# Patient Record
Sex: MALE | Race: WHITE | NOT HISPANIC OR LATINO | Employment: FULL TIME | URBAN - METROPOLITAN AREA
[De-identification: names, ages, dates, MRNs, and addresses within clinical notes are randomized per-mention and may not be internally consistent; named-entity substitution may affect disease eponyms.]

---

## 2018-05-23 ENCOUNTER — HOSPITAL ENCOUNTER (EMERGENCY)
Facility: HOSPITAL | Age: 23
Discharge: HOME | End: 2018-05-23
Attending: EMERGENCY MEDICINE

## 2018-05-23 VITALS
TEMPERATURE: 98 F | WEIGHT: 150 LBS | HEART RATE: 140 BPM | SYSTOLIC BLOOD PRESSURE: 119 MMHG | RESPIRATION RATE: 20 BRPM | OXYGEN SATURATION: 92 % | DIASTOLIC BLOOD PRESSURE: 75 MMHG | BODY MASS INDEX: 19.25 KG/M2 | HEIGHT: 74 IN

## 2018-05-23 DIAGNOSIS — T40.1X1A ACCIDENTAL OVERDOSE OF HEROIN, INITIAL ENCOUNTER: Primary | ICD-10-CM

## 2018-05-23 PROCEDURE — 99282 EMERGENCY DEPT VISIT SF MDM: CPT

## 2018-05-24 NOTE — ED PROVIDER NOTES
"Encounter Date: 5/23/2018       History     Chief Complaint   Patient presents with    Anxiety     used IV heroin prior to arrival to right wrist, was found unresponsive by friend who poured water on him and brought him to the ER     Chief complaint:  Overdose    HPI:  23-year-old male presents with not feeling well" after injecting heroin.  He denies any headache, shortness of breath nausea vomiting. He wishes to leave before further evaluation.          Review of patient's allergies indicates:  No Known Allergies  Past Medical History:   Diagnosis Date    Depression     Suicidal Ideations a/w excessive ETOH consumption     Past Surgical History:   Procedure Laterality Date    TONSILLECTOMY       Family History   Problem Relation Age of Onset    Alcohol abuse Father     Hypertension Father     Early death Paternal Uncle     Arthritis Maternal Grandmother     Cancer Maternal Grandmother     Arthritis Maternal Grandfather     Kidney disease Maternal Grandfather      Social History   Substance Use Topics    Smoking status: Current Every Day Smoker     Packs/day: 1.00     Types: Cigarettes    Smokeless tobacco: Not on file    Alcohol use Yes      Comment: Pint of whiskey or 6 pack of beer daily     Review of Systems   Constitutional: Positive for diaphoresis. Negative for activity change, appetite change, chills, fatigue and fever.   Eyes: Negative for visual disturbance.   Respiratory: Negative for apnea and shortness of breath.    Cardiovascular: Negative for chest pain and palpitations.   Gastrointestinal: Negative for abdominal distention and abdominal pain.   Genitourinary: Negative for difficulty urinating.   Musculoskeletal: Negative for neck pain.   Skin: Negative for pallor and rash.   Neurological: Negative for headaches.   Hematological: Does not bruise/bleed easily.   Psychiatric/Behavioral: Negative for agitation.       Physical Exam     Initial Vitals [05/23/18 1844]   BP Pulse Resp Temp " SpO2   119/75 (!) 140 20 97.7 °F (36.5 °C) (!) 92 %      MAP       89.67         Physical Exam    Nursing note and vitals reviewed.  Constitutional: Vital signs are normal. He is not diaphoretic.  Non-toxic appearance. He does not have a sickly appearance. No distress.   HENT:   Head: Normocephalic and atraumatic.   Pinpoint pupils   Eyes: Conjunctivae are normal.   Neck: Phonation normal. Neck supple. No thyromegaly present. No tracheal deviation present.   Cardiovascular: Normal rate.   Pulmonary/Chest: Breath sounds normal. No respiratory distress. He has no wheezes. He has no rhonchi. He has no rales.   Abdominal: Normal appearance. He exhibits no distension.   Neurological: He is alert and oriented to person, place, and time.   Skin: Skin is warm and intact. No pallor.   Diaphoretic   Psychiatric: He has a normal mood and affect. His speech is normal and behavior is normal. Thought content normal.         ED Course   Procedures  Labs Reviewed - No data to display          Medical Decision Making:   ED Management:  23-year-old male presents after heroin overdose.  He has spontaneous respirations and is oriented to person place time and situation.  Lung exam is normal with no evidence of aspiration.  He is encouraged to remain for observation but wishes to leave immediately.                      Clinical Impression:   The encounter diagnosis was Accidental overdose of heroin, initial encounter.                           Ye Garcia III, MD  05/23/18 4325

## 2018-05-24 NOTE — ED NOTES
Pt wants to leave without being seen. Pt assessed by MD in triage. Pt is awake and answering questions, drowsy, friend is with pt, and appears to be of full alertness. Pt wishes to leave despite MD advice to stay for observation. Friend instructed to monitor pt closely and come back for worsening of condition. Pt ambulated to waiting room, steady gait, friend at side.

## 2023-05-12 ENCOUNTER — HOSPITAL ENCOUNTER (EMERGENCY)
Facility: HOSPITAL | Age: 28
Discharge: HOME | End: 2023-05-12
Attending: EMERGENCY MEDICINE
Payer: OTHER MISCELLANEOUS

## 2023-05-12 VITALS
RESPIRATION RATE: 17 BRPM | DIASTOLIC BLOOD PRESSURE: 78 MMHG | HEART RATE: 84 BPM | TEMPERATURE: 98 F | SYSTOLIC BLOOD PRESSURE: 134 MMHG | HEIGHT: 73 IN | WEIGHT: 150 LBS | OXYGEN SATURATION: 98 % | BODY MASS INDEX: 19.88 KG/M2

## 2023-05-12 DIAGNOSIS — S49.90XA ARM INJURY: ICD-10-CM

## 2023-05-12 DIAGNOSIS — S62.309A CLOSED FRACTURE OF METACARPAL BONE, UNSPECIFIED FRACTURE MORPHOLOGY, UNSPECIFIED METACARPAL, UNSPECIFIED PORTION OF METACARPAL, INITIAL ENCOUNTER: Primary | ICD-10-CM

## 2023-05-12 PROCEDURE — 25000003 PHARM REV CODE 250: Performed by: EMERGENCY MEDICINE

## 2023-05-12 PROCEDURE — 99283 EMERGENCY DEPT VISIT LOW MDM: CPT

## 2023-05-12 PROCEDURE — 29125 APPL SHORT ARM SPLINT STATIC: CPT | Mod: RT

## 2023-05-12 RX ORDER — HYDROCODONE BITARTRATE AND ACETAMINOPHEN 5; 325 MG/1; MG/1
1 TABLET ORAL EVERY 4 HOURS PRN
Qty: 14 TABLET | Refills: 0 | Status: SHIPPED | OUTPATIENT
Start: 2023-05-12

## 2023-05-12 RX ORDER — HYDROCODONE BITARTRATE AND ACETAMINOPHEN 5; 325 MG/1; MG/1
1 TABLET ORAL
Status: COMPLETED | OUTPATIENT
Start: 2023-05-12 | End: 2023-05-12

## 2023-05-12 RX ADMIN — HYDROCODONE BITARTRATE AND ACETAMINOPHEN 1 TABLET: 5; 325 TABLET ORAL at 01:05

## 2023-05-12 NOTE — ED NOTES
C/O RT HAND PAIN. Pt states smashed between 2 boards. + swelling. No lac noted. + increase pain with movement. Cap refill <3 sec distal.

## 2023-05-12 NOTE — Clinical Note
"Romulo Felix (Joel) was seen and treated in our emergency department on 5/12/2023.  He may return to work on 05/22/2023.       If you have any questions or concerns, please don't hesitate to call.      Amy TOLBERT    "

## 2023-05-12 NOTE — ED PROVIDER NOTES
Encounter Date: 5/12/2023       History     Chief Complaint   Patient presents with    Hand Injury     Hand crushed between two pieces of wood 1 hour pta     28-year-old male presents emergency department with complaint of pain to the right hand.  Patient states that he was working when 2 4x4s fell on his hand.  Patient has tenderness and swelling to the right hand diffusely no open wounds noted.  Patient is right-hand dominant.  Patient has taken no medications for relief of symptoms    Review of patient's allergies indicates:  No Known Allergies  Past Medical History:   Diagnosis Date    Depression     Suicidal Ideations a/w excessive ETOH consumption     Past Surgical History:   Procedure Laterality Date    TONSILLECTOMY       Family History   Problem Relation Age of Onset    Alcohol abuse Father     Hypertension Father     Early death Paternal Uncle     Arthritis Maternal Grandmother     Cancer Maternal Grandmother     Arthritis Maternal Grandfather     Kidney disease Maternal Grandfather      Social History     Tobacco Use    Smoking status: Every Day     Packs/day: 1.00     Types: Cigarettes   Substance Use Topics    Alcohol use: Yes     Comment: Pint of whiskey or 6 pack of beer daily    Drug use: Yes     Comment: Heroin, prescription drugs (roxicodone, hydrocodone)     Review of Systems   Constitutional: Negative.    HENT: Negative.     Respiratory: Negative.     Gastrointestinal: Negative.    Genitourinary: Negative.    Musculoskeletal:         Right hand pain   Skin: Negative.    Hematological: Negative.    Psychiatric/Behavioral: Negative.     All other systems reviewed and are negative.    Physical Exam     Initial Vitals [05/12/23 1133]   BP Pulse Resp Temp SpO2   134/78 84 18 98.3 °F (36.8 °C) 98 %      MAP       --         Physical Exam    Nursing note and vitals reviewed.  Constitutional: He appears well-developed and well-nourished.   Eyes: Conjunctivae are normal. Pupils are equal, round, and  reactive to light.   Cardiovascular:  Normal rate, regular rhythm, normal heart sounds and intact distal pulses.           Musculoskeletal:      Right hand: Swelling and bony tenderness present. Decreased range of motion.        Hands:       Comments: Patient has months willing over the right 4th and 5th metacarpal area no open wounds     Neurological: He is alert and oriented to person, place, and time. He has normal strength. GCS score is 15. GCS eye subscore is 4. GCS verbal subscore is 5. GCS motor subscore is 6.   Skin: Skin is warm and dry. No rash noted.   Psychiatric: He has a normal mood and affect.       ED Course   Splint Application    Date/Time: 5/12/2023 6:26 PM  Performed by: ESTEFANI Kline  Authorized by: Anaya Burns MD   Location details: right hand  Splint type: ulnar gutter  Supplies used: Ortho-Glass  Post-procedure: The splinted body part was neurovascularly unchanged following the procedure.  Patient tolerance: Patient tolerated the procedure well with no immediate complications      Labs Reviewed - No data to display       Imaging Results              X-Ray Hand 3 view Right (Final result)  Result time 05/12/23 12:45:39      Final result by Dimas Valladares MD (05/12/23 12:45:39)                   Narrative:    XR WRIST 3 OR MORE VIEWS, XR HAND 3 OR MORE VIEWS    CLINICAL HISTORY:  28 years Male Right hand/wrist smashed in between 2 pieces of wood this morning. Swelling and redness to posterior hand and wrist    COMPARISON: None    FINDINGS: Acute comminuted fractures involving the fourth and fifth metacarpal bases, potentially with intra-articular extension into the carpometacarpal joints. Overlying soft tissue swelling. No soft tissue gas or radiopaque foreign body. Distal radius and ulna are intact. Carpus appears intact.    IMPRESSION:    Acute comminuted fractures involving the fourth and fifth metacarpal bases.    Electronically signed by:  Dimas Valladares MD  5/12/2023 12:45 PM  CDT Workstation: [id number]                                     X-Ray Wrist Complete Right (Final result)  Result time 05/12/23 12:45:39      Final result by Dimas Valladares MD (05/12/23 12:45:39)                   Narrative:    XR WRIST 3 OR MORE VIEWS, XR HAND 3 OR MORE VIEWS    CLINICAL HISTORY:  28 years Male Right hand/wrist smashed in between 2 pieces of wood this morning. Swelling and redness to posterior hand and wrist    COMPARISON: None    FINDINGS: Acute comminuted fractures involving the fourth and fifth metacarpal bases, potentially with intra-articular extension into the carpometacarpal joints. Overlying soft tissue swelling. No soft tissue gas or radiopaque foreign body. Distal radius and ulna are intact. Carpus appears intact.    IMPRESSION:    Acute comminuted fractures involving the fourth and fifth metacarpal bases.    Electronically signed by:  Dimas Valladares MD  5/12/2023 12:45 PM CDT Workstation: [id number]                                     Medications   HYDROcodone-acetaminophen 5-325 mg per tablet 1 tablet (1 tablet Oral Given 5/12/23 1330)     Medical Decision Making:   History:   Old Records Summarized: records from previous admission(s).  Initial Assessment:   28-year-old male presents emergency department with complaint of pain to the right hand.  Patient states that he was working when 2 4x4s fell on his hand.  Patient has tenderness and swelling to the right hand diffusely no open wounds noted.  Patient is right-hand dominant.  Patient has taken no medications for relief of symptoms    Differential Diagnosis:   Considerations include contusion, fracture, sprain  Clinical Tests:   Radiological Study: Ordered and Reviewed  ED Management:  28-year-old male presents emergency department reports that he had pieces of wood fell on top of his hand at work today.  Patient has comminuted fractures of the 4th and 5th metacarpal at the base of the metacarpal.  No open wounds suggestive of  fight bite.  Patient placed in a ulnar gutter splint he was given Norco with mild relief of symptoms.  Patient was given splint instructions instructed follow up with orthopedist for definitive care he will be discharged home with a short course of narcotic medications, given return precautions                        Clinical Impression:   Final diagnoses:  [S49.90XA] Arm injury  [S62.309A] Closed fracture of metacarpal bone, unspecified fracture morphology, unspecified metacarpal, unspecified portion of metacarpal, initial encounter (Primary)        ED Disposition Condition    Discharge Stable          ED Prescriptions       Medication Sig Dispense Start Date End Date Auth. Provider    HYDROcodone-acetaminophen (NORCO) 5-325 mg per tablet Take 1 tablet by mouth every 4 (four) hours as needed for Pain. 14 tablet 5/12/2023 -- ESTEFANI Kline          Follow-up Information       Follow up With Specialties Details Why Contact Info    Akshat Morales MD Orthopedic Surgery Schedule an appointment as soon as possible for a visit in 3 days  [address] [place] [address] [address] [place] [id number]  [phone]               ESTEFANI Kline  05/12/23 1828       ESTEFANI Kline  05/12/23 1829

## 2023-05-12 NOTE — DISCHARGE INSTRUCTIONS
Please leave splint on and did not get it wet  Motrin for mild pain and swelling  Norco as directed if needed for severe pain   Return for any concerns

## 2023-05-23 ENCOUNTER — HOSPITAL ENCOUNTER (OUTPATIENT)
Dept: RADIOLOGY | Facility: HOSPITAL | Age: 28
Discharge: HOME | End: 2023-05-23
Attending: ORTHOPAEDIC SURGERY
Payer: OTHER MISCELLANEOUS

## 2023-05-23 ENCOUNTER — OFFICE VISIT (OUTPATIENT)
Dept: ORTHOPEDICS | Facility: CLINIC | Age: 28
End: 2023-05-23
Payer: OTHER MISCELLANEOUS

## 2023-05-23 VITALS — BODY MASS INDEX: 19.88 KG/M2 | RESPIRATION RATE: 18 BRPM | HEIGHT: 73 IN | WEIGHT: 150 LBS

## 2023-05-23 DIAGNOSIS — S69.91XA INJURY OF RIGHT WRIST, INITIAL ENCOUNTER: ICD-10-CM

## 2023-05-23 DIAGNOSIS — S69.91XA INJURY OF RIGHT WRIST, INITIAL ENCOUNTER: Primary | ICD-10-CM

## 2023-05-23 DIAGNOSIS — S62.316A CLOSED DISPLACED FRACTURE OF BASE OF FIFTH METACARPAL BONE OF RIGHT HAND, INITIAL ENCOUNTER: ICD-10-CM

## 2023-05-23 PROCEDURE — 73200 CT HAND WITHOUT CONTRAST RIGHT: ICD-10-PCS | Mod: 26,RT,, | Performed by: RADIOLOGY

## 2023-05-23 PROCEDURE — 73200 CT UPPER EXTREMITY W/O DYE: CPT | Mod: 26,RT,, | Performed by: RADIOLOGY

## 2023-05-23 PROCEDURE — 99999 PR PBB SHADOW E&M-EST. PATIENT-LVL II: CPT | Mod: PBBFAC,,, | Performed by: ORTHOPAEDIC SURGERY

## 2023-05-23 PROCEDURE — 99999 PR PBB SHADOW E&M-EST. PATIENT-LVL II: ICD-10-PCS | Mod: PBBFAC,,, | Performed by: ORTHOPAEDIC SURGERY

## 2023-05-23 PROCEDURE — 73200 CT UPPER EXTREMITY W/O DYE: CPT | Mod: TC,RT

## 2023-05-23 PROCEDURE — 99204 OFFICE O/P NEW MOD 45 MIN: CPT | Mod: S$GLB,,, | Performed by: ORTHOPAEDIC SURGERY

## 2023-05-23 PROCEDURE — 99204 PR OFFICE/OUTPT VISIT, NEW, LEVL IV, 45-59 MIN: ICD-10-PCS | Mod: S$GLB,,, | Performed by: ORTHOPAEDIC SURGERY

## 2023-05-23 NOTE — PROGRESS NOTES
Patient ID: Romulo Felix is a 28 y.o. male    Chief Complaint:   Chief Complaint   Patient presents with    Right Hand - Injury       History of Present Illness:    This is a pleasant 28 y.o. male who presents for evaluation of right hand pain. He reports an injury 1 week ago while having his hand crushed with a 2 x 4 while at work. He  had immediate pain and difficulty bearing weight on that extremity. He presented to outside ER and was diagnosed with a 4th and 5th metacarpal base fracture. He was placed in a splint and referred to us for orthopedic evaluation. He is independent with activities of daily living at baseline. He walks without an assistive device.     Diabetic: No  Smoker:  Yes  Hx of DVT, PE: No    Hand dominance: Right    Occupation:  [employer]     PAST MEDICAL HISTORY:   Past Medical History:   Diagnosis Date    Depression     Suicidal Ideations a/w excessive ETOH consumption     PAST SURGICAL HISTORY:   Past Surgical History:   Procedure Laterality Date    TONSILLECTOMY       FAMILY HISTORY:   Family History   Problem Relation Age of Onset    Alcohol abuse Father     Hypertension Father     Early death Paternal Uncle     Arthritis Maternal Grandmother     Cancer Maternal Grandmother     Arthritis Maternal Grandfather     Kidney disease Maternal Grandfather      SOCIAL HISTORY:   Social History     Occupational History    Not on file   Tobacco Use    Smoking status: Every Day     Packs/day: 1.00     Types: Cigarettes    Smokeless tobacco: Not on file   Substance and Sexual Activity    Alcohol use: Yes     Comment: Pint of whiskey or 6 pack of beer daily    Drug use: Yes     Comment: Heroin, prescription drugs (roxicodone, hydrocodone)    Sexual activity: Yes     Partners: Female     Birth control/protection: Condom     Comment: Last used heroin one week ago.         MEDICATIONS:   Current Outpatient Medications:     HYDROcodone-acetaminophen (NORCO) 5-325 mg per tablet, Take 1 tablet by mouth  every 4 (four) hours as needed for Pain., Disp: 14 tablet, Rfl: 0  ALLERGIES: Review of patient's allergies indicates:  No Known Allergies      Physical Exam     Vitals:    05/23/23 0911   Resp: 18     Alert and oriented to person, place and time. No acute distress. Well-groomed, not ill appearing. Pupils round and reactive, normal respiratory effort, no audible wheezing.     There is mild-to-moderate swelling of the right hand and tenderness to palpation over the 4th 5th metacarpal base.  No obvious deformity.  No evidence of open injury.  No obvious rotational deformity.  FDS FDP tested in isolation without deficit.  Sensation intact distally.        Imaging:       X-Ray: I have reviewed all pertinent results/findings and my personal findings are:  Comminuted base of the 4th and 5th metacarpal fractures.  There is widening of the 5th joint line with likely impaction      Assessment & Plan    Injury of right wrist, initial encounter  -     CT Hand Without Contrast Right; Future; Expected date: 05/23/2023    Closed displaced fracture of base of fifth metacarpal bone of right hand, initial encounter         Treatment options were discussed with him in detail.  I went over his x-rays which show comminuted displaced fracture of the 4th metacarpal bases.  The 4th is overall well aligned.  The 5th metacarpal base looks quite comminuted and the joint surface looks not congruent.  My recommendation at this time is for urgent CT scan of the right hand to evaluate.  He will follow up tomorrow for results

## 2023-05-24 ENCOUNTER — ANESTHESIA EVENT (OUTPATIENT)
Dept: SURGERY | Facility: HOSPITAL | Age: 28
End: 2023-05-24
Payer: OTHER MISCELLANEOUS

## 2023-05-24 ENCOUNTER — LAB VISIT (OUTPATIENT)
Dept: LAB | Facility: HOSPITAL | Age: 28
End: 2023-05-24
Attending: ORTHOPAEDIC SURGERY
Payer: OTHER MISCELLANEOUS

## 2023-05-24 ENCOUNTER — OFFICE VISIT (OUTPATIENT)
Dept: ORTHOPEDICS | Facility: CLINIC | Age: 28
End: 2023-05-24
Payer: OTHER MISCELLANEOUS

## 2023-05-24 VITALS — WEIGHT: 150 LBS | HEIGHT: 73 IN | RESPIRATION RATE: 16 BRPM | BODY MASS INDEX: 19.88 KG/M2

## 2023-05-24 DIAGNOSIS — Z01.818 PRE-OP EXAM: ICD-10-CM

## 2023-05-24 DIAGNOSIS — S62.316A CLOSED DISPLACED FRACTURE OF BASE OF FIFTH METACARPAL BONE OF RIGHT HAND, INITIAL ENCOUNTER: Primary | ICD-10-CM

## 2023-05-24 DIAGNOSIS — S62.316A DISPLACED FRACTURE OF BASE OF FIFTH METACARPAL BONE OF RIGHT HAND: ICD-10-CM

## 2023-05-24 LAB
ALBUMIN SERPL BCP-MCNC: 4 G/DL (ref 3.5–5.2)
ALP SERPL-CCNC: 80 U/L (ref 55–135)
ALT SERPL W/O P-5'-P-CCNC: 55 U/L (ref 10–44)
ANION GAP SERPL CALC-SCNC: 6 MMOL/L (ref 8–16)
AST SERPL-CCNC: 43 U/L (ref 10–40)
BASOPHILS # BLD AUTO: 0.09 K/UL (ref 0–0.2)
BASOPHILS NFR BLD: 1.3 % (ref 0–1.9)
BILIRUB SERPL-MCNC: 0.3 MG/DL (ref 0.1–1)
BUN SERPL-MCNC: 7 MG/DL (ref 6–20)
CALCIUM SERPL-MCNC: 8.9 MG/DL (ref 8.7–10.5)
CHLORIDE SERPL-SCNC: 106 MMOL/L (ref 95–110)
CO2 SERPL-SCNC: 28 MMOL/L (ref 23–29)
CREAT SERPL-MCNC: 0.9 MG/DL (ref 0.5–1.4)
DIFFERENTIAL METHOD: ABNORMAL
EOSINOPHIL # BLD AUTO: 0.3 K/UL (ref 0–0.5)
EOSINOPHIL NFR BLD: 3.6 % (ref 0–8)
ERYTHROCYTE [DISTWIDTH] IN BLOOD BY AUTOMATED COUNT: 13.5 % (ref 11.5–14.5)
EST. GFR  (NO RACE VARIABLE): >60 ML/MIN/1.73 M^2
GLUCOSE SERPL-MCNC: 88 MG/DL (ref 70–110)
HCT VFR BLD AUTO: 35.7 % (ref 40–54)
HGB BLD-MCNC: 11.1 G/DL (ref 14–18)
IMM GRANULOCYTES # BLD AUTO: 0.01 K/UL (ref 0–0.04)
IMM GRANULOCYTES NFR BLD AUTO: 0.1 % (ref 0–0.5)
LYMPHOCYTES # BLD AUTO: 3 K/UL (ref 1–4.8)
LYMPHOCYTES NFR BLD: 42.1 % (ref 18–48)
MCH RBC QN AUTO: 24.7 PG (ref 27–31)
MCHC RBC AUTO-ENTMCNC: 31.1 G/DL (ref 32–36)
MCV RBC AUTO: 79 FL (ref 82–98)
MONOCYTES # BLD AUTO: 0.6 K/UL (ref 0.3–1)
MONOCYTES NFR BLD: 7.8 % (ref 4–15)
NEUTROPHILS # BLD AUTO: 3.3 K/UL (ref 1.8–7.7)
NEUTROPHILS NFR BLD: 45.1 % (ref 38–73)
NRBC BLD-RTO: 0 /100 WBC
PLATELET # BLD AUTO: 356 K/UL (ref 150–450)
PMV BLD AUTO: 10.1 FL (ref 9.2–12.9)
POTASSIUM SERPL-SCNC: 3.9 MMOL/L (ref 3.5–5.1)
PROT SERPL-MCNC: 7.4 G/DL (ref 6–8.4)
RBC # BLD AUTO: 4.5 M/UL (ref 4.6–6.2)
SODIUM SERPL-SCNC: 140 MMOL/L (ref 136–145)
WBC # BLD AUTO: 7.2 K/UL (ref 3.9–12.7)

## 2023-05-24 PROCEDURE — 80053 COMPREHEN METABOLIC PANEL: CPT | Performed by: ORTHOPAEDIC SURGERY

## 2023-05-24 PROCEDURE — 99999 PR PBB SHADOW E&M-EST. PATIENT-LVL II: CPT | Mod: PBBFAC,,, | Performed by: ORTHOPAEDIC SURGERY

## 2023-05-24 PROCEDURE — 36415 COLL VENOUS BLD VENIPUNCTURE: CPT | Performed by: ORTHOPAEDIC SURGERY

## 2023-05-24 PROCEDURE — 99214 PR OFFICE/OUTPT VISIT, EST, LEVL IV, 30-39 MIN: ICD-10-PCS | Mod: 57,S$GLB,, | Performed by: ORTHOPAEDIC SURGERY

## 2023-05-24 PROCEDURE — 99999 PR PBB SHADOW E&M-EST. PATIENT-LVL II: ICD-10-PCS | Mod: PBBFAC,,, | Performed by: ORTHOPAEDIC SURGERY

## 2023-05-24 PROCEDURE — 99214 OFFICE O/P EST MOD 30 MIN: CPT | Mod: 57,S$GLB,, | Performed by: ORTHOPAEDIC SURGERY

## 2023-05-24 PROCEDURE — 85025 COMPLETE CBC W/AUTO DIFF WBC: CPT | Performed by: ORTHOPAEDIC SURGERY

## 2023-05-24 RX ORDER — MUPIROCIN 20 MG/G
OINTMENT TOPICAL
Status: CANCELLED | OUTPATIENT
Start: 2023-05-24

## 2023-05-24 RX ORDER — FENTANYL CITRATE 50 UG/ML
25 INJECTION, SOLUTION INTRAMUSCULAR; INTRAVENOUS EVERY 5 MIN PRN
Status: CANCELLED | OUTPATIENT
Start: 2023-05-25

## 2023-05-24 RX ORDER — MIDAZOLAM HYDROCHLORIDE 1 MG/ML
1 INJECTION INTRAMUSCULAR; INTRAVENOUS
Status: CANCELLED | OUTPATIENT
Start: 2023-05-25

## 2023-05-24 NOTE — H&P (VIEW-ONLY)
Patient ID: Romulo Felix is a 28 y.o. male    Chief Complaint:   Chief Complaint   Patient presents with    Right Wrist - Injury, Pain       History of Present Illness:    This is a pleasant 28 y.o. male who presents for evaluation of right hand pain. He reports an injury 1 week ago while having his hand crushed with a 2 x 4 while at work. He  had immediate pain and difficulty bearing weight on that extremity. He presented to outside ER and was diagnosed with a 4th and 5th metacarpal base fracture. He was placed in a splint and referred to us for orthopedic evaluation. He is independent with activities of daily living at baseline. He walks without an assistive device.     Diabetic: No  Smoker:  Yes  Hx of DVT, PE: No    Hand dominance: Right    Occupation:  [employer]     ________________________________________________________________    Interval history 05/24/2023 : patient returns today for CT follow up of his right wrist and hand.  Also here to discuss surgery.    PAST MEDICAL HISTORY:   Past Medical History:   Diagnosis Date    Depression     Suicidal Ideations a/w excessive ETOH consumption     PAST SURGICAL HISTORY:   Past Surgical History:   Procedure Laterality Date    TONSILLECTOMY       FAMILY HISTORY:   Family History   Problem Relation Age of Onset    Alcohol abuse Father     Hypertension Father     Early death Paternal Uncle     Arthritis Maternal Grandmother     Cancer Maternal Grandmother     Arthritis Maternal Grandfather     Kidney disease Maternal Grandfather      SOCIAL HISTORY:   Social History     Occupational History    Not on file   Tobacco Use    Smoking status: Every Day     Packs/day: 1.00     Types: Cigarettes    Smokeless tobacco: Not on file   Substance and Sexual Activity    Alcohol use: Yes     Comment: Pint of whiskey or 6 pack of beer daily    Drug use: Yes     Comment: Heroin, prescription drugs (roxicodone, hydrocodone)    Sexual activity: Yes     Partners: Female     Birth  control/protection: Condom     Comment: Last used heroin one week ago.         MEDICATIONS:   Current Outpatient Medications:     HYDROcodone-acetaminophen (NORCO) 5-325 mg per tablet, Take 1 tablet by mouth every 4 (four) hours as needed for Pain., Disp: 14 tablet, Rfl: 0  ALLERGIES: Review of patient's allergies indicates:  No Known Allergies      Physical Exam     Vitals:    05/24/23 1050   Resp: 16     Alert and oriented to person, place and time. No acute distress. Well-groomed, not ill appearing. Pupils round and reactive, normal respiratory effort, no audible wheezing.     There is mild-to-moderate swelling of the right hand and tenderness to palpation over the 4th 5th metacarpal base.  No obvious deformity.  No evidence of open injury.  No obvious rotational deformity.  FDS FDP tested in isolation without deficit.  Sensation intact distally.        Imaging:       X-Ray: I have reviewed all pertinent results/findings and my personal findings are:  Comminuted base of the 4th and 5th metacarpal fractures.  There is widening of the 5th joint line with likely impaction    CT of the right hand and wrist confirms fracture of the 4th and 5th metacarpal bases.  There is comminution at the base with impaction of the 5th.  There is mild dorsal subluxation of the 4th.      Assessment & Plan    Closed displaced fracture of base of fifth metacarpal bone of right hand, initial encounter    Pre-op exam  -     COMPREHENSIVE METABOLIC PANEL; Future; Expected date: 05/24/2023  -     CBC W/ AUTO DIFFERENTIAL; Future; Expected date: 05/24/2023       Discussed with him CT results and x-rays results which show fracture subluxation 4th and 5th metacarpal with comminution.  We discussed treatment options including closed reduction percutaneous pinning versus possible open reduction and pinning.  We will plan on doing this tomorrow in the operating room.  We discussed the protocol including pin removal at 4-6 weeks and early physical  therapy.  We discussed risk of pin site infection and damage to surrounding neurovascular structures.  Despite the risks she elects to proceed.    _________________________________________________________________      Diagnosis and findings were discussed with him in lay terms. I discussed the findings and treatment options with them at length. Questions were actively sought and all questions were answered to their apparent satisfaction. We discussed the risks and benefits of surgery. We reviewed the operative indications surgical technique and potential rehabilitation involved. Risks including, but not limited to pain, bleeding, infection, damage to surrounding neurovascular structures, and other soft tissues, decreased range of motion, instability, poor cosmetic result, scarring/painful scars, poor functional result, reflex sympathetic dystrophy. We discussed as well the risk of anesthesia, DVT/PE and possible need for additional surgical intervention in lay terms. Despite the risks as explained to them, they wished to proceed with surgery. He expressed understanding and agreement with the treatment plan and understand that no guarantee of outcome is implied or expressed given.       Romulo Felix will be scheduled for the following procedure(s):     Closed versus open reduction percutaneous pinning right hand, 4th and 5th metacarpal base        Post-Op Medications to be prescribed:   Percocet 5/325mg Take 1-2 tablets every 4-6 hours PRN pain #28  Zofran 4mg oral disintegrating tablets every 8 hours PRN nausea/vomiting   Motrin 600 mg TID PRN     DME/Bracing:  None  Post-op Physical Therapy to begin: 6+ weeks    Medical Clearance: No  Hx of DVT,PE, anesthetic complications: No    Additional notes/concerns:  Workman's comp    Opioid Disclosure  Today we discussed the reasons why the prescription is necessary as well as: the risks of addiction and overdose associated with opioid drugs and the dangers of taking  opioid drugs with alcohol, benzodiazepines and other central nervous system depressants; alternative treatments that may be available; the risks associated with the use of the drugs being prescribed, specifically that opioids are highly addictive, even when taken as prescribed, that there is a risk of developing a physical or psychological dependence on the controlled dangerous substance, and that the risks of taking more opioids than prescribed or mixing sedatives, benzodiazepines or alcohol with opioids can result in fatal respiratory depression.

## 2023-05-24 NOTE — PROGRESS NOTES
Patient ID: Romulo Felix is a 28 y.o. male    Chief Complaint:   Chief Complaint   Patient presents with    Right Wrist - Injury, Pain       History of Present Illness:    This is a pleasant 28 y.o. male who presents for evaluation of right hand pain. He reports an injury 1 week ago while having his hand crushed with a 2 x 4 while at work. He  had immediate pain and difficulty bearing weight on that extremity. He presented to outside ER and was diagnosed with a 4th and 5th metacarpal base fracture. He was placed in a splint and referred to us for orthopedic evaluation. He is independent with activities of daily living at baseline. He walks without an assistive device.     Diabetic: No  Smoker:  Yes  Hx of DVT, PE: No    Hand dominance: Right    Occupation:  [employer]     ________________________________________________________________    Interval history 05/24/2023 : patient returns today for CT follow up of his right wrist and hand.  Also here to discuss surgery.    PAST MEDICAL HISTORY:   Past Medical History:   Diagnosis Date    Depression     Suicidal Ideations a/w excessive ETOH consumption     PAST SURGICAL HISTORY:   Past Surgical History:   Procedure Laterality Date    TONSILLECTOMY       FAMILY HISTORY:   Family History   Problem Relation Age of Onset    Alcohol abuse Father     Hypertension Father     Early death Paternal Uncle     Arthritis Maternal Grandmother     Cancer Maternal Grandmother     Arthritis Maternal Grandfather     Kidney disease Maternal Grandfather      SOCIAL HISTORY:   Social History     Occupational History    Not on file   Tobacco Use    Smoking status: Every Day     Packs/day: 1.00     Types: Cigarettes    Smokeless tobacco: Not on file   Substance and Sexual Activity    Alcohol use: Yes     Comment: Pint of whiskey or 6 pack of beer daily    Drug use: Yes     Comment: Heroin, prescription drugs (roxicodone, hydrocodone)    Sexual activity: Yes     Partners: Female     Birth  control/protection: Condom     Comment: Last used heroin one week ago.         MEDICATIONS:   Current Outpatient Medications:     HYDROcodone-acetaminophen (NORCO) 5-325 mg per tablet, Take 1 tablet by mouth every 4 (four) hours as needed for Pain., Disp: 14 tablet, Rfl: 0  ALLERGIES: Review of patient's allergies indicates:  No Known Allergies      Physical Exam     Vitals:    05/24/23 1050   Resp: 16     Alert and oriented to person, place and time. No acute distress. Well-groomed, not ill appearing. Pupils round and reactive, normal respiratory effort, no audible wheezing.     There is mild-to-moderate swelling of the right hand and tenderness to palpation over the 4th 5th metacarpal base.  No obvious deformity.  No evidence of open injury.  No obvious rotational deformity.  FDS FDP tested in isolation without deficit.  Sensation intact distally.        Imaging:       X-Ray: I have reviewed all pertinent results/findings and my personal findings are:  Comminuted base of the 4th and 5th metacarpal fractures.  There is widening of the 5th joint line with likely impaction    CT of the right hand and wrist confirms fracture of the 4th and 5th metacarpal bases.  There is comminution at the base with impaction of the 5th.  There is mild dorsal subluxation of the 4th.      Assessment & Plan    Closed displaced fracture of base of fifth metacarpal bone of right hand, initial encounter    Pre-op exam  -     COMPREHENSIVE METABOLIC PANEL; Future; Expected date: 05/24/2023  -     CBC W/ AUTO DIFFERENTIAL; Future; Expected date: 05/24/2023       Discussed with him CT results and x-rays results which show fracture subluxation 4th and 5th metacarpal with comminution.  We discussed treatment options including closed reduction percutaneous pinning versus possible open reduction and pinning.  We will plan on doing this tomorrow in the operating room.  We discussed the protocol including pin removal at 4-6 weeks and early physical  therapy.  We discussed risk of pin site infection and damage to surrounding neurovascular structures.  Despite the risks she elects to proceed.    _________________________________________________________________      Diagnosis and findings were discussed with him in lay terms. I discussed the findings and treatment options with them at length. Questions were actively sought and all questions were answered to their apparent satisfaction. We discussed the risks and benefits of surgery. We reviewed the operative indications surgical technique and potential rehabilitation involved. Risks including, but not limited to pain, bleeding, infection, damage to surrounding neurovascular structures, and other soft tissues, decreased range of motion, instability, poor cosmetic result, scarring/painful scars, poor functional result, reflex sympathetic dystrophy. We discussed as well the risk of anesthesia, DVT/PE and possible need for additional surgical intervention in lay terms. Despite the risks as explained to them, they wished to proceed with surgery. He expressed understanding and agreement with the treatment plan and understand that no guarantee of outcome is implied or expressed given.       Romulo Felix will be scheduled for the following procedure(s):     Closed versus open reduction percutaneous pinning right hand, 4th and 5th metacarpal base        Post-Op Medications to be prescribed:   Percocet 5/325mg Take 1-2 tablets every 4-6 hours PRN pain #28  Zofran 4mg oral disintegrating tablets every 8 hours PRN nausea/vomiting   Motrin 600 mg TID PRN     DME/Bracing:  None  Post-op Physical Therapy to begin: 6+ weeks    Medical Clearance: No  Hx of DVT,PE, anesthetic complications: No    Additional notes/concerns:  Workman's comp    Opioid Disclosure  Today we discussed the reasons why the prescription is necessary as well as: the risks of addiction and overdose associated with opioid drugs and the dangers of taking  opioid drugs with alcohol, benzodiazepines and other central nervous system depressants; alternative treatments that may be available; the risks associated with the use of the drugs being prescribed, specifically that opioids are highly addictive, even when taken as prescribed, that there is a risk of developing a physical or psychological dependence on the controlled dangerous substance, and that the risks of taking more opioids than prescribed or mixing sedatives, benzodiazepines or alcohol with opioids can result in fatal respiratory depression.

## 2023-05-25 ENCOUNTER — HOSPITAL ENCOUNTER (OUTPATIENT)
Facility: HOSPITAL | Age: 28
Discharge: HOME | End: 2023-05-25
Attending: ORTHOPAEDIC SURGERY | Admitting: ORTHOPAEDIC SURGERY
Payer: OTHER MISCELLANEOUS

## 2023-05-25 ENCOUNTER — ANESTHESIA (OUTPATIENT)
Dept: SURGERY | Facility: HOSPITAL | Age: 28
End: 2023-05-25
Payer: OTHER MISCELLANEOUS

## 2023-05-25 DIAGNOSIS — S62.316A CLOSED DISPLACED FRACTURE OF BASE OF FIFTH METACARPAL BONE OF RIGHT HAND, INITIAL ENCOUNTER: ICD-10-CM

## 2023-05-25 DIAGNOSIS — S62.316A DISPLACED FRACTURE OF BASE OF FIFTH METACARPAL BONE OF RIGHT HAND: ICD-10-CM

## 2023-05-25 PROCEDURE — 26608 TREAT METACARPAL FRACTURE: CPT | Mod: 51,RT,, | Performed by: ORTHOPAEDIC SURGERY

## 2023-05-25 PROCEDURE — 63600175 PHARM REV CODE 636 W HCPCS: Performed by: ANESTHESIOLOGY

## 2023-05-25 PROCEDURE — 26608 PR CLOSED RX METACARPAL FX,PERCUT: ICD-10-PCS | Mod: RT,,, | Performed by: ORTHOPAEDIC SURGERY

## 2023-05-25 PROCEDURE — 27200651 HC AIRWAY, LMA: Performed by: ANESTHESIOLOGY

## 2023-05-25 PROCEDURE — D9220A PRA ANESTHESIA: ICD-10-PCS | Mod: CRNA,,, | Performed by: NURSE ANESTHETIST, CERTIFIED REGISTERED

## 2023-05-25 PROCEDURE — 36000708 HC OR TIME LEV III 1ST 15 MIN: Performed by: ORTHOPAEDIC SURGERY

## 2023-05-25 PROCEDURE — D9220A PRA ANESTHESIA: Mod: CRNA,,, | Performed by: NURSE ANESTHETIST, CERTIFIED REGISTERED

## 2023-05-25 PROCEDURE — D9220A PRA ANESTHESIA: Mod: ANES,,, | Performed by: ANESTHESIOLOGY

## 2023-05-25 PROCEDURE — 37000009 HC ANESTHESIA EA ADD 15 MINS: Performed by: ORTHOPAEDIC SURGERY

## 2023-05-25 PROCEDURE — 63600175 PHARM REV CODE 636 W HCPCS: Performed by: NURSE ANESTHETIST, CERTIFIED REGISTERED

## 2023-05-25 PROCEDURE — 94799 UNLISTED PULMONARY SVC/PX: CPT

## 2023-05-25 PROCEDURE — 64415 NJX AA&/STRD BRCH PLXS IMG: CPT | Performed by: ANESTHESIOLOGY

## 2023-05-25 PROCEDURE — 25000003 PHARM REV CODE 250: Performed by: ANESTHESIOLOGY

## 2023-05-25 PROCEDURE — 63600175 PHARM REV CODE 636 W HCPCS: Performed by: ORTHOPAEDIC SURGERY

## 2023-05-25 PROCEDURE — C9290 INJ, BUPIVACAINE LIPOSOME: HCPCS | Performed by: ANESTHESIOLOGY

## 2023-05-25 PROCEDURE — 36000709 HC OR TIME LEV III EA ADD 15 MIN: Performed by: ORTHOPAEDIC SURGERY

## 2023-05-25 PROCEDURE — 71000015 HC POSTOP RECOV 1ST HR: Performed by: ORTHOPAEDIC SURGERY

## 2023-05-25 PROCEDURE — 99900104 DSU ONLY-NO CHARGE-EA ADD'L HR (STAT): Performed by: ORTHOPAEDIC SURGERY

## 2023-05-25 PROCEDURE — 25000003 PHARM REV CODE 250: Performed by: NURSE ANESTHETIST, CERTIFIED REGISTERED

## 2023-05-25 PROCEDURE — 99900103 DSU ONLY-NO CHARGE-INITIAL HR (STAT): Performed by: ORTHOPAEDIC SURGERY

## 2023-05-25 PROCEDURE — 71000033 HC RECOVERY, INTIAL HOUR: Performed by: ORTHOPAEDIC SURGERY

## 2023-05-25 PROCEDURE — 37000008 HC ANESTHESIA 1ST 15 MINUTES: Performed by: ORTHOPAEDIC SURGERY

## 2023-05-25 PROCEDURE — D9220A PRA ANESTHESIA: ICD-10-PCS | Mod: ANES,,, | Performed by: ANESTHESIOLOGY

## 2023-05-25 PROCEDURE — 71000039 HC RECOVERY, EACH ADD'L HOUR: Performed by: ORTHOPAEDIC SURGERY

## 2023-05-25 PROCEDURE — 27200750 HC INSULATED NEEDLE/ STIMUPLEX: Performed by: ANESTHESIOLOGY

## 2023-05-25 RX ORDER — BUPIVACAINE HYDROCHLORIDE 5 MG/ML
INJECTION, SOLUTION EPIDURAL; INTRACAUDAL
Status: DISCONTINUED | OUTPATIENT
Start: 2023-05-25 | End: 2023-05-25

## 2023-05-25 RX ORDER — DEXAMETHASONE SODIUM PHOSPHATE 4 MG/ML
INJECTION, SOLUTION INTRA-ARTICULAR; INTRALESIONAL; INTRAMUSCULAR; INTRAVENOUS; SOFT TISSUE
Status: DISCONTINUED | OUTPATIENT
Start: 2023-05-25 | End: 2023-05-25

## 2023-05-25 RX ORDER — OXYCODONE HYDROCHLORIDE 10 MG/1
10 TABLET ORAL EVERY 4 HOURS PRN
Status: CANCELLED | OUTPATIENT
Start: 2023-05-25

## 2023-05-25 RX ORDER — LORAZEPAM 2 MG/ML
0.25 INJECTION INTRAMUSCULAR ONCE AS NEEDED
Status: ACTIVE | OUTPATIENT
Start: 2023-05-25 | End: 2034-10-21

## 2023-05-25 RX ORDER — ONDANSETRON 4 MG/1
8 TABLET, ORALLY DISINTEGRATING ORAL EVERY 8 HOURS PRN
Status: CANCELLED | OUTPATIENT
Start: 2023-05-25

## 2023-05-25 RX ORDER — ONDANSETRON HYDROCHLORIDE 2 MG/ML
INJECTION, SOLUTION INTRAMUSCULAR; INTRAVENOUS
Status: DISCONTINUED | OUTPATIENT
Start: 2023-05-25 | End: 2023-05-25

## 2023-05-25 RX ORDER — SODIUM CHLORIDE 0.9 % (FLUSH) 0.9 %
10 SYRINGE (ML) INJECTION
Status: DISCONTINUED | OUTPATIENT
Start: 2023-05-25 | End: 2023-05-25 | Stop reason: HOSPADM

## 2023-05-25 RX ORDER — MEPERIDINE HYDROCHLORIDE 50 MG/ML
12.5 INJECTION INTRAMUSCULAR; INTRAVENOUS; SUBCUTANEOUS ONCE AS NEEDED
Status: ACTIVE | OUTPATIENT
Start: 2023-05-25 | End: 2023-05-26

## 2023-05-25 RX ORDER — OXYCODONE HYDROCHLORIDE 5 MG/1
5 TABLET ORAL ONCE AS NEEDED
Status: ACTIVE | OUTPATIENT
Start: 2023-05-25 | End: 2034-10-21

## 2023-05-25 RX ORDER — ONDANSETRON 2 MG/ML
4 INJECTION INTRAMUSCULAR; INTRAVENOUS ONCE AS NEEDED
Status: ACTIVE | OUTPATIENT
Start: 2023-05-25 | End: 2034-10-21

## 2023-05-25 RX ORDER — DIPHENHYDRAMINE HYDROCHLORIDE 50 MG/ML
12.5 INJECTION INTRAMUSCULAR; INTRAVENOUS ONCE AS NEEDED
Status: ACTIVE | OUTPATIENT
Start: 2023-05-25 | End: 2034-10-21

## 2023-05-25 RX ORDER — PROPOFOL 10 MG/ML
VIAL (ML) INTRAVENOUS
Status: DISCONTINUED | OUTPATIENT
Start: 2023-05-25 | End: 2023-05-25

## 2023-05-25 RX ORDER — FENTANYL CITRATE 50 UG/ML
25 INJECTION, SOLUTION INTRAMUSCULAR; INTRAVENOUS EVERY 5 MIN PRN
Status: ACTIVE | OUTPATIENT
Start: 2023-05-25

## 2023-05-25 RX ORDER — IBUPROFEN 600 MG/1
600 TABLET ORAL EVERY 6 HOURS PRN
Status: CANCELLED | OUTPATIENT
Start: 2023-05-25

## 2023-05-25 RX ORDER — OXYCODONE AND ACETAMINOPHEN 5; 325 MG/1; MG/1
1 TABLET ORAL EVERY 6 HOURS PRN
Qty: 28 TABLET | Refills: 0 | Status: SHIPPED | OUTPATIENT
Start: 2023-05-25

## 2023-05-25 RX ORDER — MUPIROCIN 20 MG/G
OINTMENT TOPICAL
Status: DISCONTINUED | OUTPATIENT
Start: 2023-05-25 | End: 2023-05-25 | Stop reason: HOSPADM

## 2023-05-25 RX ORDER — CEFAZOLIN SODIUM 2 G/50ML
2 SOLUTION INTRAVENOUS
Status: COMPLETED | OUTPATIENT
Start: 2023-05-25 | End: 2023-05-25

## 2023-05-25 RX ORDER — LIDOCAINE HYDROCHLORIDE 20 MG/ML
INJECTION INTRAVENOUS
Status: DISCONTINUED | OUTPATIENT
Start: 2023-05-25 | End: 2023-05-25

## 2023-05-25 RX ORDER — IBUPROFEN 600 MG/1
600 TABLET ORAL 3 TIMES DAILY
Qty: 30 TABLET | Refills: 0 | Status: SHIPPED | OUTPATIENT
Start: 2023-05-25 | End: 2023-06-04

## 2023-05-25 RX ORDER — PROCHLORPERAZINE EDISYLATE 5 MG/ML
5 INJECTION INTRAMUSCULAR; INTRAVENOUS EVERY 30 MIN PRN
Status: ACTIVE | OUTPATIENT
Start: 2023-05-25

## 2023-05-25 RX ORDER — FENTANYL CITRATE 50 UG/ML
INJECTION, SOLUTION INTRAMUSCULAR; INTRAVENOUS
Status: DISCONTINUED | OUTPATIENT
Start: 2023-05-25 | End: 2023-05-25

## 2023-05-25 RX ORDER — LIDOCAINE HYDROCHLORIDE 10 MG/ML
1 INJECTION, SOLUTION EPIDURAL; INFILTRATION; INTRACAUDAL; PERINEURAL ONCE
Status: ACTIVE | OUTPATIENT
Start: 2023-05-25

## 2023-05-25 RX ORDER — ONDANSETRON 4 MG/1
4 TABLET, ORALLY DISINTEGRATING ORAL EVERY 8 HOURS PRN
Qty: 30 TABLET | Refills: 0 | Status: SHIPPED | OUTPATIENT
Start: 2023-05-25

## 2023-05-25 RX ORDER — HYDROCODONE BITARTRATE AND ACETAMINOPHEN 5; 325 MG/1; MG/1
1 TABLET ORAL EVERY 4 HOURS PRN
Status: CANCELLED | OUTPATIENT
Start: 2023-05-25

## 2023-05-25 RX ORDER — HYDROMORPHONE HYDROCHLORIDE 2 MG/ML
0.2 INJECTION, SOLUTION INTRAMUSCULAR; INTRAVENOUS; SUBCUTANEOUS EVERY 5 MIN PRN
Status: ACTIVE | OUTPATIENT
Start: 2023-05-25

## 2023-05-25 RX ADMIN — LIDOCAINE HYDROCHLORIDE 100 MG: 20 INJECTION, SOLUTION INTRAVENOUS at 12:05

## 2023-05-25 RX ADMIN — BUPIVACAINE HYDROCHLORIDE 12.5 ML: 5 INJECTION, SOLUTION EPIDURAL; INTRACAUDAL; PERINEURAL at 11:05

## 2023-05-25 RX ADMIN — DEXAMETHASONE SODIUM PHOSPHATE 4 MG: 4 INJECTION, SOLUTION INTRA-ARTICULAR; INTRALESIONAL; INTRAMUSCULAR; INTRAVENOUS; SOFT TISSUE at 12:05

## 2023-05-25 RX ADMIN — FENTANYL CITRATE 50 MCG: 50 INJECTION, SOLUTION INTRAMUSCULAR; INTRAVENOUS at 12:05

## 2023-05-25 RX ADMIN — BUPIVACAINE 12.5 ML: 13.3 INJECTION, SUSPENSION, LIPOSOMAL INFILTRATION at 11:05

## 2023-05-25 RX ADMIN — SODIUM CHLORIDE, SODIUM GLUCONATE, SODIUM ACETATE, POTASSIUM CHLORIDE AND MAGNESIUM CHLORIDE: 526; 502; 368; 37; 30 INJECTION, SOLUTION INTRAVENOUS at 10:05

## 2023-05-25 RX ADMIN — PROPOFOL 200 MG: 10 INJECTION, EMULSION INTRAVENOUS at 12:05

## 2023-05-25 RX ADMIN — ONDANSETRON 4 MG: 2 INJECTION INTRAMUSCULAR; INTRAVENOUS at 12:05

## 2023-05-25 RX ADMIN — CEFAZOLIN SODIUM 2 G: 2 SOLUTION INTRAVENOUS at 12:05

## 2023-05-25 NOTE — TRANSFER OF CARE
"Anesthesia Transfer of Care Note    Patient: Romulo Felix    Procedure(s) Performed: Procedure(s) (LRB):  ORIF, HAND (Right)    Patient location: PACU    Anesthesia Type: general    Transport from OR: Transported from OR on room air with adequate spontaneous ventilation    Post pain: adequate analgesia    Post assessment: no apparent anesthetic complications    Post vital signs: stable    Level of consciousness: awake    Nausea/Vomiting: no nausea/vomiting    Complications: none    Transfer of care protocol was followed      Last vitals:   Visit Vitals  BP (!) 119/58   Pulse 72   Temp 37.5 °C (99.5 °F) (Skin)   Resp 12   Ht 6' 1" (1.854 m)   Wt 68 kg (150 lb)   SpO2 98%   BMI 19.79 kg/m²     "

## 2023-05-25 NOTE — PLAN OF CARE
Pre-op complete. Family called to bedside. Text notifications initiated. Pt denies need for safe. Belongings in post op.

## 2023-05-25 NOTE — PLAN OF CARE
Discharge instructions given to pt and family/friend, verbalized understanding and questions answered. Belongings given back to pt. IV removed- catheter intact. Discharge via wheelchair.

## 2023-05-25 NOTE — PLAN OF CARE
VSS. NAD. Resp E/U. Calm and cooperative. Speech appropriate. Tolerating clear liquids. Denies nausea. Pain controlled. IV patent. No swelling or redness. Dressing to rt hand CDI. Ice applied and sling in place. Family notified of patient status. All safety measures taken. Bed in low position. Bedrails up x2. Bed locked.

## 2023-05-25 NOTE — ANESTHESIA POSTPROCEDURE EVALUATION
Anesthesia Post Evaluation    Patient: Romulo Felix    Procedure(s) Performed: Procedure(s) (LRB):  ORIF, HAND (Right)    Final Anesthesia Type: general      Patient location during evaluation: PACU  Patient participation: Yes- Able to Participate  Level of consciousness: awake and alert  Post-procedure vital signs: reviewed and stable  Pain management: adequate  Airway patency: patent    PONV status at discharge: No PONV  Anesthetic complications: no      Cardiovascular status: hemodynamically stable  Respiratory status: unassisted and room air  Hydration status: euvolemic  Follow-up not needed.          Vitals Value Taken Time   /62 05/25/23 1515   Temp 36.7 °C (98.1 °F) 05/25/23 1415   Pulse 65 05/25/23 1515   Resp 18 05/25/23 1515   SpO2 97 % 05/25/23 1515         Event Time   Out of Recovery 14:44:00         Pain/Joao Score: Joao Score: 10 (5/25/2023  2:30 PM)  Modified Joao Score: 20 (5/25/2023  3:15 PM)

## 2023-05-25 NOTE — ANESTHESIA PREPROCEDURE EVALUATION
05/25/2023  Romulo Felix is a 28 y.o., male.      Pre-op Assessment    I have reviewed the Patient Summary Reports.     I have reviewed the Nursing Notes. I have reviewed the NPO Status.   I have reviewed the Medications.     Review of Systems  Anesthesia Hx:  No problems with previous Anesthesia    Social:  Former Smoker    Hematology/Oncology:     Oncology Normal     EENT/Dental:EENT/Dental Normal   Pulmonary:  Pulmonary Normal    Musculoskeletal:   Right hand fracture    Psych:   Psychiatric History depression          Physical Exam  General: Well nourished, Cooperative, Alert and Oriented    Airway:  Mallampati: II   Mouth Opening: Normal  TM Distance: Normal  Tongue: Normal  Neck ROM: Normal ROM    Dental:  Intact        Anesthesia Plan  Type of Anesthesia, risks & benefits discussed:    Anesthesia Type: Gen Supraglottic Airway  Intra-op Monitoring Plan: Standard ASA Monitors  Post Op Pain Control Plan: multimodal analgesia and peripheral nerve block  Induction:  IV  Airway Plan: , Post-Induction  Informed Consent: Informed consent signed with the Patient and all parties understand the risks and agree with anesthesia plan.  All questions answered.   ASA Score: 2    Ready For Surgery From Anesthesia Perspective.     .

## 2023-05-25 NOTE — ANESTHESIA PROCEDURE NOTES
Peripheral Block    Patient location during procedure: pre-op   Block not for primary anesthetic.  Reason for block: at surgeon's request and post-op pain management   Post-op Pain Location: right hand   Start time: 5/25/2023 11:40 AM  Timeout: 5/25/2023 11:40 AM   End time: 5/25/2023 11:45 AM    Staffing  Authorizing Provider: Ihsan Mandujano MD  Performing Provider: Ihsan Mandujano MD    Preanesthetic Checklist  Completed: patient identified, IV checked, site marked, risks and benefits discussed, surgical consent, monitors and equipment checked, pre-op evaluation and timeout performed  Peripheral Block  Patient position: supine  Prep: ChloraPrep  Patient monitoring: heart rate, cardiac monitor, continuous pulse ox, continuous capnometry and frequent blood pressure checks  Block type: supraclavicular  Laterality: right  Injection technique: single shot  Needle  Needle type: Stimuplex   Needle gauge: 22 G  Needle length: 2 in  Needle localization: anatomical landmarks and ultrasound guidance   -ultrasound image captured on disc.  Assessment  Injection assessment: negative aspiration, negative parasthesia and local visualized surrounding nerve  Paresthesia pain: none  Heart rate change: no  Slow fractionated injection: yes  Pain Tolerance: comfortable throughout block and no complaints  Medications:    Medications: bupivacaine (pf) (MARCAINE) injection 0.5% - Perineural   12.5 mL - 5/25/2023 11:45:00 AM    Additional Notes  VSS.  [facility] RN monitoring vitals throughout procedure.  Patient tolerated procedure well.

## 2023-05-25 NOTE — OP NOTE
05/25/2023    PREOPERATIVE DIAGNOSIS:      Closed displaced intra-articular fracture base right 5th metacarpal  Closed displaced fracture base right 4th metacarpal    POSTOPERATIVE DIAGNOSIS: Same    PROCEDURE:      Closed reduction percutaneous pinning right 5th metacarpal  Closed reduction percutaneous pinning right 4th metacarpal      SURGEON: Akshat Morales MD    ASSISTANT: Doyle Nguyen _ PAC    He was present and scrubbed for the entirety of the procedure. They were required for patient positioning, retraction, insertion of implants and closure. Without him I would have been unable to safely perform the case due to only one scrub tech available.     ANESTHESIA:  general     ESTIMATED BLOOD LOSS:  5 mL    INDICATIONS:  Romulo Felix is a 28 y.o. year-old male who presented to my clinic with a chief complaint of right hand pain after a work injury. Imaging revealed a displaced intra-articular fractures of the 4th and 5th metacarpal bases..  We discussed treatment options including closed reduction versus open reduction versus nonoperative treatment.  Based on the amount of displacement and patient's age and functional status he wanted to proceed with surgical intervention.      We discussed the risks and benefits of the surgery in detail including stiffness, CRPS, damage to surrounding neurovascular structures, failure of fixation as well as need for revision surgery. Despite these risks they elected to proceed.       COMPONENTS USED:      * No implants in log *      DESCRIPTION OF PROCEDURE:  The patient was seen in the pre-operative area where consents were verified and the surgical site marked. They did receive a preoperative block for intra-operative and postoperative analgesia. The patient was taken to the Operating Room where anesthesia was administered by the Anesthesia Department. He was then placed in the supine  position and all superficial neurovascular structures were well padded.  The right  upper extremity  was then sterilely prepped and draped in the normal fashion.  Preoperative antibiotics were administered.  A time-out was performed verifying the procedure and laterality, all agreed and elected to proceed as planned.    We brought C-arm into the field and verified fracture displacement of the 4th and 5th metacarpal base.  The 5th metacarpal base was intra-articular with impaction.  We used a K-wire and placed it into the fracture site and used it as a joystick to get a better reduction.  We had a satisfactory reduction at this point and I placed a 0.054 K-wire through the 5th 4th and 3rd metacarpal base.  I placed a parallel pin slightly distal to this.  I also placed a 3rd pin through the tip of the base of the 5th metacarpal into the 4th metacarpal shaft for additional fixation as well as a slightly more distal transmetacarpal pin due to extension of the 4th base fracture into the shaft.  I took final x-rays.  I was satisfied with my length alignment and overall rotation.  We bent and cut the K-wires.  Covered these with Xeroform and  was placed in a ulnar gutter splint.    Disposition:      He will be nonweightbearing.  We will plan for pin removal at 4-6 weeks pending x-rays.  I will see him back in 2 weeks to convert to a cast.    Akshat Morales MD

## 2023-05-25 NOTE — DISCHARGE INSTRUCTIONS
Post op instructions for prevention of DVT  What is deep vein thrombosis?  Deep vein thrombosis (DVT) is the medical term for blood clots in the deep veins of the leg.  These blood clots can be dangerous.  A DVT can block a blood vessel and keep blood from getting where it needs to go.  Another problem is that the clot can travel to other parts of the body such as the lungs.  A clot that travels to the lungs is called a pulmonary embolus (PE) and can cause serious problems with breathing which can lead to death.  Am I at risk for DVT/PE?  If you are not very active, you are at risk of DVT.  Anyone confined to bed, sitting for long periods of time, recovering from surgery, etc. increases the risk of DVT.  Other risk factors are cancer diagnosis, certain medications, estrogen replacement in any form,older age, obesity, pregnancy, smoking, history of clotting disorders, and dehydration.  How will I know if I have a DVT?  Swelling in the lower leg  Pain  Warmth, redness, hardness or bulging of the vein  If you have any of these symptoms, call your doctors office right away.  Some people will not have any symptoms until the clot moves to the lungs.  What are the symptoms of a PE?  Panting, shortness of breath, or trouble breathing  Sharp, knife-like chest pain when you breathe  Coughing or coughing up blood  Rapid heartbeat  If you have any of these symptoms or get worse quickly, call 911 for emergency treatment.  How can I prevent a DVT?  Avoid long periods of inactivity and dont cross your legs--get up and walk around every hour or so.  Stay active--walking after surgery is highly encouraged.  This means you should get out of the house and walk in the neighborhood.  Going up and down stairs will not impair healing (unless advised against such activity by your doctor).    Drink plenty of noncaffeinated, nonalcoholic fluids each day to prevent dehydration.  Wear special support stockings, if they have been advised by  "your doctor.  If you travel, stop at least once an hour and walk around.  Avoid smoking (assistance with stopping is available through your healthcare provider)  Always notify your doctor if you are not able to follow the post operative instructions that are given to you at the time of discharge.  It may be necessary to prescribe one of the medications available to prevent DVT. We hope your stay was comfortable as you heal now, mend and rest.    For we have enjoyed taking care of you by giving your our best.    And as you get better, by regaining your health and strength;   We count it as a privilege to have served you and hope your time at [facility] was well spent.      Thank  You!!! Discharge Instructions: After Your Surgery/Procedure  Youve just had surgery. During surgery you were given medicine called anesthesia to keep you relaxed and free of pain. After surgery you may have some pain or nausea. This is common. Here are some tips for feeling better and getting well after surgery.     Stay on schedule with your medication.   Going home  Your doctor or nurse will show you how to take care of yourself when you go home. He or she will also answer your questions. Have an adult family member or friend drive you home.      For your safety we recommend these precaution for the first 24 hours after your procedure:  Do not drive or use heavy equipment.  Do not make important decisions or sign legal papers.  Do not drink alcohol.  Have someone stay with you, if needed. He or she can watch for problems and help keep you safe.  Your concentration, balance, coordination, and judgement may be impaired for many hours after anesthesia.  Use caution when ambulating or standing up.     You may feel weak and "washed out" after anesthesia and surgery.      Subtle residual effects of general anesthesia or sedation with regional / local anesthesia can last more than 24 hours.  Rest for the remainder of the day or longer if your " Doctor/Surgeon has advised you to do so.  Although you may feel normal within the first 24 hours, your reflexes and mental ability may be impaired without you realizing it.  You may feel dizzy, lightheaded or sleepy for 24 hours or longer.      Be sure to go to all follow-up visits with your doctor. And rest after your surgery for as long as your doctor tells you to.  Coping with pain  If you have pain after surgery, pain medicine will help you feel better. Take it as told, before pain becomes severe. Also, ask your doctor or pharmacist about other ways to control pain. This might be with heat, ice, or relaxation. And follow any other instructions your surgeon or nurse gives you.  Tips for taking pain medicine  To get the best relief possible, remember these points:  Pain medicines can upset your stomach. Taking them with a little food may help.  Most pain relievers taken by mouth need at least 20 to 30 minutes to start to work.  Taking medicine on a schedule can help you remember to take it. Try to time your medicine so that you can take it before starting an activity. This might be before you get dressed, go for a walk, or sit down for dinner.  Constipation is a common side effect of pain medicines. Call your doctor before taking any medicines such as laxatives or stool softeners to help ease constipation. Also ask if you should skip any foods. Drinking lots of fluids and eating foods such as fruits and vegetables that are high in fiber can also help. Remember, do not take laxatives unless your surgeon has prescribed them.  Drinking alcohol and taking pain medicine can cause dizziness and slow your breathing. It can even be deadly. Do not drink alcohol while taking pain medicine.  Pain medicine can make you react more slowly to things. Do not drive or run machinery while taking pain medicine.  Your health care provider may tell you to take acetaminophen to help ease your pain. Ask him or her how much you are  supposed to take each day. Acetaminophen or other pain relievers may interact with your prescription medicines or other over-the-counter (OTC) drugs. Some prescription medicines have acetaminophen and other ingredients. Using both prescription and OTC acetaminophen for pain can cause you to overdose. Read the labels on your OTC medicines with care. This will help you to clearly know the list of ingredients, how much to take, and any warnings. It may also help you not take too much acetaminophen. If you have questions or do not understand the information, ask your pharmacist or health care provider to explain it to you before you take the OTC medicine.  Managing nausea  Some people have an upset stomach after surgery. This is often because of anesthesia, pain, or pain medicine, or the stress of surgery. These tips will help you handle nausea and eat healthy foods as you get better. If you were on a special food plan before surgery, ask your doctor if you should follow it while you get better. These tips may help:  Do not push yourself to eat. Your body will tell you when to eat and how much.  Start off with clear liquids and soup. They are easier to digest.  Next try semi-solid foods, such as mashed potatoes, applesauce, and gelatin, as you feel ready.  Slowly move to solid foods. Dont eat fatty, rich, or spicy foods at first.  Do not force yourself to have 3 large meals a day. Instead eat smaller amounts more often.  Take pain medicines with a small amount of solid food, such as crackers or toast, to avoid nausea.     Call your surgeon if  You still have pain an hour after taking medicine. The medicine may not be strong enough.  You feel too sleepy, dizzy, or groggy. The medicine may be too strong.  You have side effects like nausea, vomiting, or skin changes, such as rash, itching, or hives.       If you have obstructive sleep apnea  You were given anesthesia medicine during surgery to keep you comfortable and  free of pain. After surgery, you may have more apnea spells because of this medicine and other medicines you were given. The spells may last longer than usual.   At home:  Keep using the continuous positive airway pressure (CPAP) device when you sleep. Unless your health care provider tells you not to, use it when you sleep, day or night. CPAP is a common device used to treat obstructive sleep apnea.  Talk with your provider before taking any pain medicine, muscle relaxants, or sedatives. Your provider will tell you about the possible dangers of taking these medicines.  © 6821-1755 [employer]. [address] [place] [id number]. All rights reserved. This information is not intended as a substitute for professional medical care. Always follow your healthcare professional's instructions. Using an Incentive Spirometer    An incentive spirometer is a device that helps you do deep breathing exercises. These exercises expand your lungs, aid in circulation, and help prevent pneumonia. Deep breathing exercises also help you breathe better and improve the function of your lungs by:  Keeping your lungs clear  Strengthening your breathing muscles  Helping prevent respiratory complications or problems  The incentive spirometer gives you a way to take an active part in recover. A nurse or therapist will teach you breathing exercises. To do these exercises, you will breathe in through your mouth and not your nose. The incentive spirometer only works correctly if you breathe in through your mouth.  Steps to clear lungs  Step 1. Exhale normally. Then, inhale normally.  Relax and breathe out.  Step 2. Place your lips tightly around the mouthpiece.  Make sure the device is upright and not tilted.  Step 3. Inhale as much air as you can through the mouthpiece (don't breath through your nose).  Inhale slowly and deeply.  Hold your breath long enough to keep the balls or disk raised for at least 3 to 5 seconds, or  as instructed by your healthcare provider.  Some spirometers have an indicator to let you know that you are breathing in too fast. If the indicator goes off, breathe in more slowly.  Step 4. Repeat the exercise regularly.  Do this exercise every hour while you're awake, or as instructed by your healthcare provider.  If you were taught deep breathing and coughing exercises, do them regularly as instructed by your healthcare provider.

## 2023-05-25 NOTE — ANESTHESIA PROCEDURE NOTES
Intubation    Date/Time: 5/25/2023 12:23 PM  Performed by: Thierry Sorensen CRNA  Authorized by: Ihsan Mandujano MD     Intubation:     Induction:  Intravenous    Intubated:  Postinduction    Mask Ventilation:  Not attempted    Attempts:  1    Attempted By:  CRNA    Difficult Airway Encountered?: No      Complications:  None    Airway Device:  Supraglottic airway/LMA    Airway Device Size:  4.0    Style/Cuff Inflation:  Cuffed    Secured at:  The lips    Placement Verified By:  Capnometry    Complicating Factors:  None    Findings Post-Intubation:  BS equal bilateral

## 2023-05-25 NOTE — BRIEF OP NOTE
[facility]  Brief Operative Note    Surgery Date: 5/25/2023     Surgeon(s) and Role:     * Akshat Morales MD - Primary    Assisting Surgeon: None    Pre-op Diagnosis:  Closed displaced fracture of base of fifth metacarpal bone of right hand, initial encounter [S62.316A]    Post-op Diagnosis:  Post-Op Diagnosis Codes:     * Closed displaced fracture of base of fifth metacarpal bone of right hand, initial encounter [S62.316A]    Procedure(s) (LRB):  ORIF, HAND (Right)    Anesthesia: General/Regional    Operative Findings: see op note     Estimated Blood Loss: * No values recorded between 5/25/2023 12:38 PM and 5/25/2023  1:08 PM *         Specimens:   Specimen (24h ago, onward)      None              Discharge Note    OUTCOME: Patient tolerated treatment/procedure well without complication and is now ready for discharge.    DISPOSITION: Home or Self Care    FINAL DIAGNOSIS:  <principal problem not specified>    FOLLOWUP: In clinic    DISCHARGE INSTRUCTIONS:  No discharge procedures on file.

## 2023-05-26 VITALS
RESPIRATION RATE: 18 BRPM | HEART RATE: 65 BPM | BODY MASS INDEX: 19.88 KG/M2 | DIASTOLIC BLOOD PRESSURE: 62 MMHG | TEMPERATURE: 98 F | OXYGEN SATURATION: 97 % | WEIGHT: 150 LBS | HEIGHT: 73 IN | SYSTOLIC BLOOD PRESSURE: 111 MMHG

## 2023-06-05 DIAGNOSIS — S62.316A CLOSED DISPLACED FRACTURE OF BASE OF FIFTH METACARPAL BONE OF RIGHT HAND, INITIAL ENCOUNTER: Primary | ICD-10-CM

## 2023-06-06 ENCOUNTER — OFFICE VISIT (OUTPATIENT)
Dept: ORTHOPEDICS | Facility: CLINIC | Age: 28
End: 2023-06-06
Payer: OTHER MISCELLANEOUS

## 2023-06-06 ENCOUNTER — HOSPITAL ENCOUNTER (OUTPATIENT)
Dept: RADIOLOGY | Facility: HOSPITAL | Age: 28
Discharge: HOME | End: 2023-06-06
Attending: ORTHOPAEDIC SURGERY
Payer: OTHER MISCELLANEOUS

## 2023-06-06 VITALS — BODY MASS INDEX: 19.88 KG/M2 | WEIGHT: 150 LBS | HEIGHT: 73 IN | RESPIRATION RATE: 17 BRPM

## 2023-06-06 DIAGNOSIS — S62.316A CLOSED DISPLACED FRACTURE OF BASE OF FIFTH METACARPAL BONE OF RIGHT HAND, INITIAL ENCOUNTER: Primary | ICD-10-CM

## 2023-06-06 DIAGNOSIS — S62.316A CLOSED DISPLACED FRACTURE OF BASE OF FIFTH METACARPAL BONE OF RIGHT HAND, INITIAL ENCOUNTER: ICD-10-CM

## 2023-06-06 DIAGNOSIS — S62.316D CLOSED DISPLACED FRACTURE OF BASE OF FIFTH METACARPAL BONE OF RIGHT HAND WITH ROUTINE HEALING, SUBSEQUENT ENCOUNTER: ICD-10-CM

## 2023-06-06 PROCEDURE — 99999 PR PBB SHADOW E&M-EST. PATIENT-LVL II: ICD-10-PCS | Mod: PBBFAC,,, | Performed by: ORTHOPAEDIC SURGERY

## 2023-06-06 PROCEDURE — 99024 POSTOP FOLLOW-UP VISIT: CPT | Mod: S$GLB,,, | Performed by: ORTHOPAEDIC SURGERY

## 2023-06-06 PROCEDURE — 73130 X-RAY EXAM OF HAND: CPT | Mod: TC,PO,RT

## 2023-06-06 PROCEDURE — 73130 XR HAND COMPLETE 3 VIEW RIGHT: ICD-10-PCS | Mod: 26,RT,, | Performed by: RADIOLOGY

## 2023-06-06 PROCEDURE — 73130 X-RAY EXAM OF HAND: CPT | Mod: 26,RT,, | Performed by: RADIOLOGY

## 2023-06-06 PROCEDURE — 99999 PR PBB SHADOW E&M-EST. PATIENT-LVL II: CPT | Mod: PBBFAC,,, | Performed by: ORTHOPAEDIC SURGERY

## 2023-06-06 PROCEDURE — 99024 PR POST-OP FOLLOW-UP VISIT: ICD-10-PCS | Mod: S$GLB,,, | Performed by: ORTHOPAEDIC SURGERY

## 2023-06-06 NOTE — PROGRESS NOTES
Post-op Note    HPI    Romulo Felix is here 2 weeks s/p the following procedure:     Closed reduction percutaneous pinning right hand including the 4th and 5th metacarpals    Overall doing well. Pain controlled on current regimen.  No drainage from the pin sites.  Compliant with nonweightbearing        Physical Exam:     Patient is alert and oriented no acute distress.       Right hand pin sites clean dry intact There is no evidence of infection.  There is no induration erythema or signs of infection.  Appropriate soft tissue swelling.  Compartments are soft and compressible.  Warm well-perfused extremity.    Imaging:     I have personally reviewed the following imaging and these are an interpretation of my findings:     X-Ray: I have reviewed all pertinent results/findings and my personal findings are:  Pin fixation of the 4th and 5th metacarpal base fracture.  Well-aligned.  No evidence of hardware failure    Assessment    Romulo Felix is 2 weeks Post-op     Plan:    Overall doing as expected.  We discussed expectations of surgery and postoperative course.     Pain: Continued postoperative pain regimen -- Tylenol/ibuprofen  Placed in a short arm ulnar gutter cast today.  Follow up 3 weeks with x-rays out of cast likely pin removal at that time

## 2023-06-26 DIAGNOSIS — S62.316D CLOSED DISPLACED FRACTURE OF BASE OF FIFTH METACARPAL BONE OF RIGHT HAND WITH ROUTINE HEALING, SUBSEQUENT ENCOUNTER: Primary | ICD-10-CM

## 2023-06-27 ENCOUNTER — HOSPITAL ENCOUNTER (OUTPATIENT)
Dept: RADIOLOGY | Facility: HOSPITAL | Age: 28
Discharge: HOME | End: 2023-06-27
Attending: ORTHOPAEDIC SURGERY
Payer: OTHER MISCELLANEOUS

## 2023-06-27 ENCOUNTER — OFFICE VISIT (OUTPATIENT)
Dept: ORTHOPEDICS | Facility: CLINIC | Age: 28
End: 2023-06-27
Payer: OTHER MISCELLANEOUS

## 2023-06-27 VITALS — BODY MASS INDEX: 19.88 KG/M2 | WEIGHT: 150 LBS | RESPIRATION RATE: 16 BRPM | HEIGHT: 73 IN

## 2023-06-27 DIAGNOSIS — S62.316D CLOSED DISPLACED FRACTURE OF BASE OF FIFTH METACARPAL BONE OF RIGHT HAND WITH ROUTINE HEALING, SUBSEQUENT ENCOUNTER: ICD-10-CM

## 2023-06-27 DIAGNOSIS — S62.316A CLOSED DISPLACED FRACTURE OF BASE OF FIFTH METACARPAL BONE OF RIGHT HAND, INITIAL ENCOUNTER: Primary | ICD-10-CM

## 2023-06-27 PROCEDURE — 99024 POSTOP FOLLOW-UP VISIT: CPT | Mod: S$GLB,,, | Performed by: ORTHOPAEDIC SURGERY

## 2023-06-27 PROCEDURE — 99024 PR POST-OP FOLLOW-UP VISIT: ICD-10-PCS | Mod: S$GLB,,, | Performed by: ORTHOPAEDIC SURGERY

## 2023-06-27 PROCEDURE — 73130 X-RAY EXAM OF HAND: CPT | Mod: 26,RT,, | Performed by: RADIOLOGY

## 2023-06-27 PROCEDURE — 73130 XR HAND COMPLETE 3 VIEW RIGHT: ICD-10-PCS | Mod: 26,RT,, | Performed by: RADIOLOGY

## 2023-06-27 PROCEDURE — 73130 X-RAY EXAM OF HAND: CPT | Mod: TC,PO,RT

## 2023-06-27 PROCEDURE — 99999 PR PBB SHADOW E&M-EST. PATIENT-LVL III: ICD-10-PCS | Mod: PBBFAC,,, | Performed by: ORTHOPAEDIC SURGERY

## 2023-06-27 PROCEDURE — 99999 PR PBB SHADOW E&M-EST. PATIENT-LVL III: CPT | Mod: PBBFAC,,, | Performed by: ORTHOPAEDIC SURGERY

## 2023-06-27 NOTE — PROGRESS NOTES
Post-op Note    HPI    Romulo Felix is here 5 weeks s/p the following procedure:     Closed reduction percutaneous pinning right hand including the 4th and 5th metacarpals    Overall doing well. Pain controlled on current regimen.  No drainage from the pin sites.  Compliant with nonweightbearing        Physical Exam:     Patient is alert and oriented no acute distress.       Right hand pin sites clean dry intact There is no evidence of infection.  There is no induration erythema or signs of infection.  Appropriate soft tissue swelling.  Compartments are soft and compressible.  Warm well-perfused extremity.    Imaging:     I have personally reviewed the following imaging and these are an interpretation of my findings:     X-Ray: I have reviewed all pertinent results/findings and my personal findings are:  Pin fixation of the 4th and 5th metacarpal base fracture.  Well-aligned.  No evidence of hardware failure .  Evidence of early healing.    Assessment    Romulo Felix is 6 weeks Post-op     Plan:    Overall doing as expected.  We discussed expectations of surgery and postoperative course.     Pain: Continued postoperative pain regimen -- Tylenol/ibuprofen  Pins removed at bedside, well tolerated  Begin hand therapy to work on range of motion and strength.  5 lb weight limit    Follow up 3 weeks with x-rays

## 2023-07-06 ENCOUNTER — CLINICAL SUPPORT (OUTPATIENT)
Dept: REHABILITATION | Facility: HOSPITAL | Age: 28
End: 2023-07-06
Attending: ORTHOPAEDIC SURGERY
Payer: OTHER MISCELLANEOUS

## 2023-07-06 DIAGNOSIS — S62.316A CLOSED DISPLACED FRACTURE OF BASE OF FIFTH METACARPAL BONE OF RIGHT HAND, INITIAL ENCOUNTER: Primary | ICD-10-CM

## 2023-07-06 PROCEDURE — 97110 THERAPEUTIC EXERCISES: CPT | Mod: PN

## 2023-07-06 PROCEDURE — 97165 OT EVAL LOW COMPLEX 30 MIN: CPT | Mod: PN

## 2023-07-06 NOTE — PLAN OF CARE
[facility]  Initial Evaluation   Date: 7/6/2023  Name: Romulo Felix  Clinic Number: [id number]  Therapy Diagnosis: right hand pain, stiffness, and weakness  Physician: Akshat Morales MD  Physician Orders: Eval and Treat  Begin hand therapy to work on range of motion and strength.  5 lb weight limit   Medical Diagnosis: S62.316A (ICD-10-CM) - Closed displaced fracture of base of fifth metacarpal bone of right hand, initial encounter   Surgical Procedure and Date: 5/25/2023  , ORIF, HAND Right   Evaluation Date: 7/6/2023  Insurance Authorization Period Expiration: 5/24/24  Plan of Care Certification Period: 8/30/23  Date of Return to MD: 7/25/23  Visit # / Visits authorized: Percy/1  Time In:10:00  Time Out: 10:45  Total Billable Time: 8 minutes    Precautions:  Standard   post op follow protocol    Subjective   Patient states: Been working my hand. Able to make a fist and straighten my fingers. Hand is still swollen. Hurts to  tightly and when I bring my wrist back which I can't do yet like my other one.   Doctor is going to do another Xray in a few weeks to see if it's healed enough for me to go back to work.   Involved Side: right  Dominant Side: Right  Date of Onset: 5/12/23 injury; 5/25/23 surgery  History of Current Condition/Mechanism of Injury: per emergency room note on 5/12/2023 working when 2  4 x 4 s fell on his hand. Patient noted that the 4 x 4 compressed into his hand demonstrating extending his wrist  Imaging: X-Ray:  Pin fixation of the 4th and 5th metacarpal base fracture.  Well-aligned.  No evidence of hardware failure .  Evidence of early healing.  6/27/2023   Previous Therapy: none    Past Medical History/Physical Systems Review:    has a past medical history of Depression.   has a past surgical history that includes Tonsillectomy and Open reduction and internal fixation (ORIF) of injury of hand (Right, 5/25/2023).  has a current medication list  which includes the following prescription(s): hydrocodone-acetaminophen, ondansetron, and oxycodone-acetaminophen, and the following Facility-Administered Medications: diphenhydramine, fentanyl, hydromorphone (pf), lidocaine (pf) 10 mg/ml (1%), lorazepam, ondansetron, oxycodone, and prochlorperazine.    Review of patient's allergies indicates:  No Known Allergies     Patient's Goals for Therapy: able to use hand normally  Pain:  Functional Pain Scale Rating 0-10: at eval 0/10  0/10 on average  0/10 at best  2/10 at worst  Location: right metacarpals  Description: tightness and little pain  Aggravating Factors: tight fist and extend wrist   Easing Factors: stop activity  Occupation:  [employer]  Working presently: out of work at this time  Current Job Specific Functional Deficits carry wood, use power hand tools, lifting walls, climbing   Previous functional status includes: Independent with all ADLs, working full time, full duty    lives with his family            FOTO outcome measure for hand  score: eval 57%   Predicted Goal 72 %-documents in Epic-see Media section    Objective   Observation: mild swelling unable to see extensor tendons and MP joints primarily at ulnar aspect of the hand, decrease in skin folds at fingers also; well healed pin sites   Sensation: denies any deficits  Palpation: mild tenderness at ulnar aspect of hand   Wrist -  Range Of Motion      7/6/2023 7/6/2023  Motion AROM Left A/PROM Right   Flexion 60 40/45   Extension 65 45/45   Ulnar Deviation NT 20   Radial Deviation NT 20   Wrist Extension and UD painful at little metacarpal.  Able to make a tight fist except Ring MP flexion 75,  left  95.  Full finger extension except little lacking 5 degrees active Proximal interphalangeal joint extension. Unable to hyperextend at ring (unable to lift off table) and achieving ~ 5 degrees at little finger.    Hand -  Strength  Martín dynamometer 2nd Rung in lbs;   Pinches: pinch gauge average of 2 trials in psi    7/06/23 7/06/23   Position Right Left   Hand : Trial 1 20 110   Hand : Trial 2 17 93   Hand : Trial 3 17 98    Average 18 100   Pinch Lateral 6.5 19   3 jaw bill 8 18.5   Tip 5 10      Treatment   Treatment Time In: 10:37  Treatment Time Out: 10:45  Total Treatment time separate from Evaluation time: 8 minutes   Patient received therapeutic exercises for 8 minutes including:   -AROM of wrist all planes, gentle pressure into radial aspect of the hand volar to gently stretch into extension and dorsum of hand for flexion   -Tendon glides  -fingers extension off table top each finger then all simultaneously  -fingers abduction and adduction   -RED theraputty gripping and pinching    Home Exercise Program/Education:  Issued HEP (see patient instructions in EMR) and educated on modality use for pain management . Exercises were reviewed and he was able to demonstrate them prior to the end of the session. Pt received a written copy of exercises to perform at home. He demonstrated good  understanding of the education provided.  Pt was advised to perform these exercises free of pain, go to point of stretch only. Suggested that he try to  his screw gun (only tool he has at home) using left to help support as needed. Hold in various positions needed to perform his job. Cautioned him not to weightbear into hand.  Patient Education: role of OT, goals for OT, scheduling/cancellations - pt verbalized understanding. Discussed insurance limitations with patient.    Assessment     Patient is a 28 y.o. right handed male presenting to skilled OT with diagnosis of status post right 4th and 5th metacarpal ORIF with pinning on 5/25/23 sustained on 5/12/23 when 2  4 x 4 s fell on his hand. Patient with pain of 0/10 to 2/10 described as tight.  He is mildly tender to palpation and has mild swelling at ulnar aspect of the hand. His  ROM is minimally limited; pain noted  with UD extension, and ulnar fingers ROM. He reports independence with self care with compensation for some and unable to work at this time due to healing process. His FOTO score: 57%.  A brief history was obtained from the patient and MD and surgery notes.  During the evaluation, the patient required no  modification or assistance.  There ar no anticipated barriers/co-morbid conditions/personal factors may affect the plan of care.   Patient will benefit from OT to address problems hindering functional use of UE. The following goals were discussed with the patient and patient is in agreement with them as to be addressed in the treatment plan. The patient's rehab potential is Good. Patient's educational, spiritual, cultural needs were considered.       Goals:   Short Term Goals  Independent in home program of ROM and strengthening in 2 visits  Patient with increase in range of motion and decrease in pain allowing increase use of right for self care in 2 weeks  Patient with increase in range of motion and strength and decrease in pain allowing him to be able to hold screw gun without pain in 3 weeks  Long Term   Patient with increase in range of motion and strength and decrease in pain allowing for greater ease with all household tasks in 5 weeks  Patient with increase in range of motion and strength and decrease in pain allowing for return to work in 6 weeks  Improve FOTO score by 10 points indicating improved function of right hand in 6 weeks  Plan   Outpatient Occupational Therapy 2 times weekly for 4 weeks then may decrease to 1 time a week for 2 weeks (will reassess periodically and adjust as needed) to include the following interventions: . Home Exercise and Stretching, Patient Education, Therapeutic Exercise (65827) - Improve muscle strength, ROM, flexibility and muscle function, Manual Stretching (86092) - Passive or Active stretching to improve muscle length and function, Soft Tissue Mobs (71733) - Increase  ROM tissue length, joint mechanics and modulate pain, Cryotherapy (51427) - Application of cold to decrease local swelling and decrease pain , Heat (55439) -  Application of heat to increase local circulation and decrease pain,  Whirlpool/fluidotherapy (21453) - Increase local tissue circulation, improve elasticity of tissues, increased blood flow for improved muscle strength, ROM, flexibility and function, Therapeutic activities (81605) use of dynamic activities to improve functional performance, Kinesio taping. Orthotic fabrication as needed      PAVEL Mcnair

## 2023-07-06 NOTE — PATIENT INSTRUCTIONS
Patient cautioned not to weightbear into hand.     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 Using left to assist holding as needed.

## 2023-07-17 ENCOUNTER — CLINICAL SUPPORT (OUTPATIENT)
Dept: REHABILITATION | Facility: HOSPITAL | Age: 28
End: 2023-07-17
Payer: OTHER MISCELLANEOUS

## 2023-07-17 DIAGNOSIS — S62.316A CLOSED DISPLACED FRACTURE OF BASE OF FIFTH METACARPAL BONE OF RIGHT HAND, INITIAL ENCOUNTER: Primary | ICD-10-CM

## 2023-07-17 PROCEDURE — 97110 THERAPEUTIC EXERCISES: CPT | Mod: PN

## 2023-07-17 PROCEDURE — 97022 WHIRLPOOL THERAPY: CPT | Mod: PN

## 2023-07-17 NOTE — PROGRESS NOTES
[facility] Occupational Therapy Daily Treatment Note   Date: 7/17/2023  Name: Romulo Felix  Clinic Number: [id number]  Therapy Diagnosis: right hand pain, stiffness, and weakness  Physician: Akshat Morales MD  Physician Orders: Eval and Treat  Begin hand therapy to work on range of motion and strength.  5 lb weight limit   Medical Diagnosis: S62.316A (ICD-10-CM) - Closed displaced fracture of base of fifth metacarpal bone of right hand,  Surgical Procedure and Date: 5/25/2023  , ORIF, HAND Right   Evaluation Date: 7/6/2023  Insurance Authorization Period Expiration: 5/24/24  Plan of Care Certification Period: 8/30/23  Date of Return to MD: 7/25/23  Visit # / Visits authorized: Dollyal1/1 1/20  Time In: 1:02  Time Out: 1:45  Total Billable Time: 43 minutes    Precautions:  Standard   post op follow protocol    Subjective   Pt reports: It's better. Can move the wrist a little better.    was compliant with home exercise program given    Response to previous treatment:better  Functional change: able to use right more to bathe and dress with minimal increase in pain indicating noted with ulnar deviation of wrist  Pain: 0/10  Location: left hand     Objective   Active range of motion Wrist extension 55(+10)  flexion 55 (+15) degrees    Patient received the following treatment:   Supervised modalities after being cleared for contradictions for 10 minutes including:      Fluidotherapy to right hand for 10 minutes to increase tissue elasticity to improve ROM. Air flow 80  heat 112 degrees with Active range of motion in it   Therapeutic exercises to improve functional performance while increasing strength, endurance, ROM,  and flexibility  for 30  minutes,    including:   -Active assistive/Passive range of motion wrist all planes  -Gripper  20#  10 repetitions   15# 20 repetitions   -RED theraputty gripping, rolling out and pinching   Therapeutic activities to improve functional performance with  use of dynamic activities for 3 minutes including:  -Flexbar simulated  towel wringing 10 repetitions, open/close screwtop 10 repetitions each and door knob turning 5 repetitions each   Instruction and demonstration given of all introduced   Patient required moderate cuing for correct performance of exercise.    Home Exercises and Education Provided   Education provided:  - discussed insurance limitation  - Progress towards goals     Written Home Exercises Provided: Patient instructed to cont prior HEP.  Exercises were reviewed and he was able to demonstrate them prior to the end of the session. he demonstrated good  understanding of the HEP provided.   See EMR under Patient Instructions for exercises provided prior visit.    Assessment   Patient reporting feeling better and improving wrist range of motion.He notes able to use right hand more to assist with self care with minimal pain. He noted pain at 5th metacarpal with pinching and flexbar exercise especially those that required ulnar deviation. Pt is achieving an increase of 15 and 10 degrees in wrist flexion and extension with 5 and 10 degrees lag persisting as compared to the left.  Patient will continue to benefit from skilled OT to further address pain and deficits in ROM and strength which are hindering ability to perform self care and IADLs. Patient's cultural,spiritual, and educational needs were considered    Goals:  Short Term Goals  Independent in home program of ROM and strengthening in 2 visits-met  Patient with increase in range of motion and decrease in pain allowing increase use of right for self care in 2 weeks-met  Patient with increase in range of motion and strength and decrease in pain allowing him to be able to hold screw gun without pain in 3 weeks  Plan   Continue skilled therapy 2 times a week for 4 weeks then may decrease to 1 time a week for 2 weeks including therapeutic exercises and activities, manual therapy, and pain modalities  as  needed   next session: --Velcro exercise board  large dowel with handle rolling back and forth on wide strip 5 repetitions       PAVEL Mcnair

## 2023-07-19 ENCOUNTER — CLINICAL SUPPORT (OUTPATIENT)
Dept: REHABILITATION | Facility: HOSPITAL | Age: 28
End: 2023-07-19
Payer: OTHER MISCELLANEOUS

## 2023-07-19 DIAGNOSIS — S62.316A CLOSED DISPLACED FRACTURE OF BASE OF FIFTH METACARPAL BONE OF RIGHT HAND, INITIAL ENCOUNTER: Primary | ICD-10-CM

## 2023-07-19 PROCEDURE — 97022 WHIRLPOOL THERAPY: CPT | Mod: PN

## 2023-07-19 PROCEDURE — 97110 THERAPEUTIC EXERCISES: CPT | Mod: PN

## 2023-07-19 PROCEDURE — 97530 THERAPEUTIC ACTIVITIES: CPT | Mod: PN

## 2023-07-19 NOTE — PROGRESS NOTES
[facility] Occupational Therapy Daily Treatment Note   Date: 7/19/2023  Name: Romulo Felix  Clinic Number: [id number]  Therapy Diagnosis: right hand pain, stiffness, and weakness  Physician: Akshat Morales MD  Physician Orders: Eval and Treat  Begin hand therapy to work on range of motion and strength.  5 lb weight limit   Medical Diagnosis: S62.316A (ICD-10-CM) - Closed displaced fracture of base of fifth metacarpal bone of right hand,  Surgical Procedure and Date: 5/25/2023  , ORIF, HAND Right   Evaluation Date: 7/6/2023  Insurance Authorization Period Expiration: 5/24/24  Plan of Care Certification Period: 8/30/23  Date of Return to MD: 7/25/23  Visit # / Visits authorized: Dollyal1/1 2/20  Time In: 8:32  Time Out: 9:15  Total Billable Time: 43 minutes    Precautions:  Standard   post op follow protocol    Subjective   Pt reports: Was on the computer. Had to stop, quit using mouse. Got a lot of cramping on that side (ulnar) of the hand. Ring and little fingers still feel tight. Working the putty, not bad.   At this pont not sure I could do my job.     was compliant with home exercise program given    Response to previous treatment:better  Functional change: as above  Pain: 0/10  Location: left hand     Objective   7/17/23 Active range of motion Wrist extension 55(+10)  flexion 55 (+15) degrees    Patient received the following treatment:   Supervised modalities after being cleared for contradictions for 10 minutes including:      Fluidotherapy to right hand for 10 minutes to increase tissue elasticity to improve ROM. Air flow 80  heat 112 degrees with Active range of motion in it   Therapeutic exercises to improve functional performance while increasing strength, endurance, ROM,  and flexibility  for 8 minutes,    including:   -Active assistive/Passive range of motion wrist all planes; fisting and claw   -Gripper  20#  10 repetitions   15# 20 repetitions   -Wrist free weight 2#  extension, flexion and deviation 20 repetitions each   Therapeutic activities to improve functional performance with use of dynamic activities for 25 minutes including:  ADDED --Velcro exercise board  large dowel with handle rolling back and forth on wide strip 1.5 repetitions  had to stop secondary to hand cramping  ADDED-screw board  5 screws in/out of screw board with gross grasp on screw   Instruction and demonstration given of all introduced   Patient required moderate cuing for correct performance of exercise.    Home Exercises and Education Provided   Education provided:  - discussed insurance limitation  - Progress towards goals     Written Home Exercises Provided: Patient instructed to cont prior HEP.  Exercises were reviewed and he was able to demonstrate them prior to the end of the session. he demonstrated good  understanding of the HEP provided.   See EMR under Patient Instructions for exercises provided prior visit.    Assessment   Patient noting significant pain persisting into ulnar aspect of hand with ulnar deviation of wrist, hindering daily activities.  He has pain with gripping exercise at 20# of resistance.  Cramping of ulnar side of hand with tight gripping exercise.  Patient will continue to benefit from skilled OT to further address pain and deficits in ROM and strength which are hindering ability to perform self care and IADLs. Patient's cultural,spiritual, and educational needs were considered    Goals:  Short Term Goals  Independent in home program of ROM and strengthening in 2 visits-met  Patient with increase in range of motion and decrease in pain allowing increase use of right for self care in 2 weeks-met  Patient with increase in range of motion and strength and decrease in pain allowing him to be able to hold screw gun without pain in 3 weeks-ongoing  Plan   Continue skilled therapy 2 times a week for 4 weeks then may decrease to 1 time a week for 2 weeks including therapeutic  exercises and activities, manual therapy, and pain modalities  as needed   next session: PAVEL Hawley

## 2023-07-19 NOTE — Clinical Note
Patient has an appt with you on Tues 7/25. Patient noting significant pain persisting into ulnar aspect of hand with ulnar deviation of wrist, hindering daily activities.  Cramping of ulnar side of hand with tight gripping exercise. Pt is achieving an increase of 15 and 10 degrees in wrist flexion and extension with 5 and 10 degrees lag persisting as compared to the left.   Right  20#  left 100#.  Plan to continue with therapy 2 times a week for 4-6 weeks. Thank you, Selam

## 2023-07-24 DIAGNOSIS — S62.316D CLOSED DISPLACED FRACTURE OF BASE OF FIFTH METACARPAL BONE OF RIGHT HAND WITH ROUTINE HEALING, SUBSEQUENT ENCOUNTER: Primary | ICD-10-CM

## 2023-07-25 ENCOUNTER — HOSPITAL ENCOUNTER (OUTPATIENT)
Dept: RADIOLOGY | Facility: HOSPITAL | Age: 28
Discharge: HOME | End: 2023-07-25
Attending: ORTHOPAEDIC SURGERY
Payer: OTHER MISCELLANEOUS

## 2023-07-25 ENCOUNTER — OFFICE VISIT (OUTPATIENT)
Dept: ORTHOPEDICS | Facility: CLINIC | Age: 28
End: 2023-07-25
Payer: OTHER MISCELLANEOUS

## 2023-07-25 VITALS — HEIGHT: 73 IN | WEIGHT: 150 LBS | BODY MASS INDEX: 19.88 KG/M2 | RESPIRATION RATE: 16 BRPM

## 2023-07-25 DIAGNOSIS — S62.316D CLOSED DISPLACED FRACTURE OF BASE OF FIFTH METACARPAL BONE OF RIGHT HAND WITH ROUTINE HEALING, SUBSEQUENT ENCOUNTER: Primary | ICD-10-CM

## 2023-07-25 DIAGNOSIS — S62.316D CLOSED DISPLACED FRACTURE OF BASE OF FIFTH METACARPAL BONE OF RIGHT HAND WITH ROUTINE HEALING, SUBSEQUENT ENCOUNTER: ICD-10-CM

## 2023-07-25 PROCEDURE — 99999 PR PBB SHADOW E&M-EST. PATIENT-LVL III: CPT | Mod: PBBFAC,,, | Performed by: ORTHOPAEDIC SURGERY

## 2023-07-25 PROCEDURE — 73130 XR HAND COMPLETE 3 VIEW RIGHT: ICD-10-PCS | Mod: 26,RT,, | Performed by: RADIOLOGY

## 2023-07-25 PROCEDURE — 99999 PR PBB SHADOW E&M-EST. PATIENT-LVL III: ICD-10-PCS | Mod: PBBFAC,,, | Performed by: ORTHOPAEDIC SURGERY

## 2023-07-25 PROCEDURE — 73130 X-RAY EXAM OF HAND: CPT | Mod: TC,PO,RT

## 2023-07-25 PROCEDURE — 99024 PR POST-OP FOLLOW-UP VISIT: ICD-10-PCS | Mod: S$GLB,,, | Performed by: ORTHOPAEDIC SURGERY

## 2023-07-25 PROCEDURE — 73130 X-RAY EXAM OF HAND: CPT | Mod: 26,RT,, | Performed by: RADIOLOGY

## 2023-07-25 PROCEDURE — 99024 POSTOP FOLLOW-UP VISIT: CPT | Mod: S$GLB,,, | Performed by: ORTHOPAEDIC SURGERY

## 2023-07-25 NOTE — PROGRESS NOTES
Post-op Note    HPI    Romulo Felix is here 8 weeks s/p the following procedure:     Closed reduction percutaneous pinning right hand including the 4th and 5th metacarpals    Overall doing well. Pain controlled on current regimen.  Currently in physical therapy and improving however still having pain mostly in the ulnar aspect of the wrist and hypothenar region.  Pain worse with ulnar deviation.        Physical Exam:     Patient is alert and oriented no acute distress.       Right hand pin sites clean dry intact There is no evidence of infection.  There is no induration erythema or signs of infection.  Appropriate soft tissue swelling.  Compartments are soft and compressible.  Warm well-perfused extremity.  Able to make composite fist.  Pain with ulnar deviation.  Tenderness over the 5th metacarpal    Imaging:     I have personally reviewed the following imaging and these are an interpretation of my findings:     Healed base of the 5th metacarpal fracture, previous pin sites evident    Assessment    Romulo Felix is 8 weeks Post-op     Plan:    Overall doing as expected.  We discussed expectations of surgery and postoperative course.     Pain: Continued postoperative pain regimen -- Tylenol/ibuprofen  Pain will likely continue to improve with physical therapy.  X-rays show healed fracture.  Likely soft tissue atrophy secondary to bracing and surgery.  Follow up 4 weeks.

## 2023-07-25 NOTE — LETTER
July 25, 2023    Romulo Felix  [facility]  [address] [place] [address]         [facility]  [address] [place] [address] [address]  [facility] [address]  Phone: [phone] July 25, 2023     Patient: Romulo Felix   YOB: 1995   Date of Visit: 7/25/2023       To Whom It May Concern:    It is my medical opinion that Romulo Felix should remain out of work until 08/28/2023 .    If you have any questions or concerns, please don't hesitate to call.    Sincerely,        Akshat Morales MD

## 2023-07-27 ENCOUNTER — CLINICAL SUPPORT (OUTPATIENT)
Dept: REHABILITATION | Facility: HOSPITAL | Age: 28
End: 2023-07-27
Payer: OTHER MISCELLANEOUS

## 2023-07-27 DIAGNOSIS — S62.316A CLOSED DISPLACED FRACTURE OF BASE OF FIFTH METACARPAL BONE OF RIGHT HAND, INITIAL ENCOUNTER: Primary | ICD-10-CM

## 2023-07-27 PROCEDURE — 97022 WHIRLPOOL THERAPY: CPT | Mod: PN

## 2023-07-27 PROCEDURE — 97530 THERAPEUTIC ACTIVITIES: CPT | Mod: PN

## 2023-07-27 NOTE — PROGRESS NOTES
[facility] Occup[facility] Therapy Daily Treatment Note   Date: 7/27/2023  Name: Romulo Felix  Clinic Number: [id number]  Therapy Diagnosis: right hand pain, stiffness, and weakness  Physician: Akshat Morales MD  Physician Orders: Eval and Treat  Begin hand therapy to work on range of motion and strength.  5 lb weight limit   Medical Diagnosis: S62.316A (ICD-10-CM) - Closed displaced fracture of base of fifth metacarpal bone of right hand,  Surgical Procedure and Date: 5/25/2023  , ORIF, HAND Right   Evaluation Date: 7/6/2023  Insurance Authorization Period Expiration: 5/24/24  Plan of Care Certification Period: 8/30/23  Date of Return to MD: 8/22/23  Visit # / Visits authorized: Kali 1/1   3/20  Time In: 3:16  Time Out: 4:00  Total Billable Time: 44 minutes    Precautions:  Standard   post op follow protocol    Subjective   Pt reports: The exercise are definitely helping. Cocking the wrist to the side (ulnar) still the worst. Still getting cramping in the hand when I use it. Saw the doctor, looks good. Going back to see him the end of the month and hope by then I'll be able to go back to work.     was compliant with home exercise program given    Response to previous treatment:better  Functional change: able to lift about 3/4 full gallon  Pain: 0/10  Location: left hand     Objective    Patient received the following treatment:   Supervised modalities after being cleared for contradictions for 8 minutes including:      Fluidotherapy to right hand for 8 minutes to increase tissue elasticity to improve ROM. Air flow 80  heat 112 degrees with Active range of motion in it   Therapeutic exercises to improve functional performance while increasing strength, endurance, ROM,  and flexibility  for 5 minutes,    including:   -Active assistive/Passive range of motion wrist all planes  -Gripper  20#  15 repetitions   25# ( increase by 5#) 10 repetitions   -Wrist free weight 2# extension, flexion  and deviation 20 repetitions each   Therapeutic activities to improve functional performance with use of dynamic activities for 32 minutes including:   -RED flexbar simulated door knob turning pain with supination   -rolling of cardboard dowel to flatten RED putty pain if increase pressure at ulnar side of hand  -GREEN theraband pulling from overhead toward him to simulate up ladder x 10 repetitions   -GREEN theraband simulating lifting with palm pulling into elbow flexion x 10 repetitions (simulating lifting of framed wall  -GREEN theraband simulating pushing up overhead x 10 repetitions (simulating pushing objects overhead  Instruction and demonstration given of all introduced   Patient required minimal  cuing for correct performance of exercise.    Home Exercises and Education Provided   Education provided:  - discussed insurance limitation  - Progress towards goals     Written Home Exercises Provided:7/27 Throughout the day for a few minutes at a time perform strengthening exercise and simulate work tasks. Discussed as tolerated lifting and using of screw gun in various positions required for work tasks, simulate lifting of framed wall by pushing up on underside of table top, begin slowly weightbearing into hand. Discussed possible use of a padded fingerless glove for work as needed.  Patient instructed to cont prior HEP. Exercises were reviewed and he was able to demonstrate them prior to the end of the session. he demonstrated good  understanding of the HEP provided. See EMR under Patient Instructions for exercises provided prior visit.    Assessment   Patient noting feels getting better. Able to lift a little more weight. He continues with pain with ulnar deviation and with gripping activities getting cramping in his hand requiring him to stop activity at hand. As simulating job tasks today noted pain at ulnar side of hand.  Added to home program patient aware of additions. Patient will continue to benefit  from skilled OT to further address pain and deficits in ROM and strength which are hindering ability to perform self care and IADLs. Patient's cultural,spiritual, and educational needs were considered    Goals:  Short Term Goals  Independent in home program of ROM and strengthening in 2 visits-met  Patient with increase in range of motion and decrease in pain allowing increase use of right for self care in 2 weeks-met  Patient with increase in range of motion and strength and decrease in pain allowing him to be able to hold screw gun without pain in 3 weeks-ongoing  Long Term   Patient with increase in range of motion and strength and decrease in pain allowing for greater ease with all household tasks in 5 weeks  Patient with increase in range of motion and strength and decrease in pain allowing for return to work in 6 weeks  Improve FOTO score by 10 points indicating improved function of right hand in 6 weeks  Plan   Continue skilled therapy 2 times a week for 3 weeks then may decrease to 1 time a week for 2 weeks including therapeutic exercises and activities, manual therapy, and pain modalities  as needed   next session: PAVEL Hawley

## 2023-08-08 ENCOUNTER — CLINICAL SUPPORT (OUTPATIENT)
Dept: REHABILITATION | Facility: HOSPITAL | Age: 28
End: 2023-08-08
Payer: OTHER MISCELLANEOUS

## 2023-08-08 DIAGNOSIS — S62.316A CLOSED DISPLACED FRACTURE OF BASE OF FIFTH METACARPAL BONE OF RIGHT HAND, INITIAL ENCOUNTER: Primary | ICD-10-CM

## 2023-08-08 PROCEDURE — 97022 WHIRLPOOL THERAPY: CPT | Mod: PN

## 2023-08-08 PROCEDURE — 97530 THERAPEUTIC ACTIVITIES: CPT | Mod: PN

## 2023-08-08 NOTE — PROGRESS NOTES
[facility] Therapy Daily Treatment Note   Date: 8/8/2023  Name: Romulo Felix  Clinic Number: [id number]  Therapy Diagnosis: right hand pain, stiffness, and weakness  Physician: Akshat Morales MD  Physician Orders: Eval and Treat  Begin hand therapy to work on range of motion and strength.  5 lb weight limit   Medical Diagnosis: S62.316A (ICD-10-CM) - Closed displaced fracture of base of fifth metacarpal bone of right hand,  Surgical Procedure and Date: 5/25/2023  , ORIF, HAND Right   Evaluation Date: 7/6/2023  Insurance Authorization Period Expiration: 5/24/24  Plan of Care Certification Period: 8/30/23  Date of Return to MD: 8/22/23  Visit # / Visits authorized: Kali 1/1 4/20  Time In: 9:05  Time Out: 9:45  Total Billable Time: 40 minutes  Precautions:  Standard   post op follow protocol  FOTO: 8/7/23  67% eval 57%  goal 72%  Subjective   Pt reports: Doing much better. Able to  screw gun with the battery in it just still bothers me with turning it some.  Can put a little more pressure into my hand. Carrying grocery bags more. During session noted overall arm feeling weak.   was compliant with home exercise program given    Response to previous treatment:better  Functional change: able to lift about 3/4 full gallon  Pain: 0/10  Location: left hand     Objective   8/07/23 Active range of motion:  Wrist extension Right 70 degrees   Left 90 degrees   Patient received the following treatment:   Supervised modalities after being cleared for contradictions for 8 minutes including:      Fluidotherapy to right hand for 8 minutes to increase tissue elasticity to improve ROM. Air flow 80  heat 112 degrees with Active range of motion in it   Therapeutic exercises to improve functional performance while increasing strength, endurance, ROM,  and flexibility  for 54minutes,    including:   -Active assistive/Passive range of motion wrist all planes  -Gripper   25#  10  repetitions   35# 10 repetitions (increased by 10#)   Therapeutic activities to improve functional performance with use of dynamic activities for 27 minutes including:  ADDED:-Dowel with 3# rolled up 3 repetitions with wrist extension    -RED flexbar simulated towel wringing with wrist flexion and extension and door knob turning pain with pronation   -rolling of cardboard dowel to flatten RED putty pain if increase pressure at ulnar side of hand  -Velcro exercise board large dowel with handle rolling back and forth on wide strip 5 repetitions   Instruction and demonstration given of all introduced   Patient required minimal  cuing for correct performance of exercise.    Home Exercises and Education Provided   Education provided:  - discussed insurance limitation  - Progress towards goals     Written Home Exercises Provided:7/27 Throughout the day for a few minutes at a time perform strengthening exercise and simulate work tasks. Discussed as tolerated lifting and using of screw gun in various positions required for work tasks, simulate lifting of framed wall by pushing up on underside of table top, begin slowly weightbearing into hand. Discussed possible use of a padded fingerless glove for work as needed.  Patient instructed to cont prior HEP. Exercises were reviewed and he was able to demonstrate them prior to the end of the session. he demonstrated good  understanding of the HEP provided. See EMR under Patient Instructions for exercises provided prior visit.    Assessment   Patient noting pain decreasing. Pain primarily at injury site with pressure on it.  He is exhibiting an increase in wrist extension by 15 degrees since 7/17.  He was able to complete Velcro exercise board with no cramping in hand today. He did note soreness by end of session but not painful.  Able to weightbear into hand a little better and use keyboard much better. Noting increase in functional use of hand.  Overall tolerated therapy better  today. FOTO score increased by 10% as compared to eval.  Patient will continue to benefit from skilled OT to further address pain and deficits in ROM and strength which are hindering ability to perform self care and IADLs. Patient's cultural,spiritual, and educational needs were considered    Goals:  Short Term Goals  Independent in home program of ROM and strengthening in 2 visits-met  Patient with increase in range of motion and decrease in pain allowing increase use of right for self care in 2 weeks-met  Patient with increase in range of motion and strength and decrease in pain allowing him to be able to hold screw gun without pain in 3 weeks-met  Long Term   Patient with increase in range of motion and strength and decrease in pain allowing for greater ease with all household tasks in 5 weeks-ongoing  Patient with increase in range of motion and strength and decrease in pain allowing for return to work in 6 weeks-ongoing  Improve FOTO score by 10 points indicating improved function of right hand in 6 weeks-met  8/08 further improve score by 5%.  Plan   Continue skilled therapy 2 times a week for 3 weeks then may decrease to 1 time a week for 2 weeks including therapeutic exercises and activities, manual therapy, and pain modalities  as needed   next session: carrying/lifting of crate    PAVEL Mcnair

## 2023-08-14 ENCOUNTER — DOCUMENTATION ONLY (OUTPATIENT)
Dept: REHABILITATION | Facility: HOSPITAL | Age: 28
End: 2023-08-14
Payer: OTHER MISCELLANEOUS

## 2023-08-14 NOTE — PROGRESS NOTES
Patient canceled scheduled OT appt on /10 and 8/14 due to appt time no longer works. PAVEL Mcnair

## 2023-08-15 ENCOUNTER — TELEPHONE (OUTPATIENT)
Dept: REHABILITATION | Facility: HOSPITAL | Age: 28
End: 2023-08-15
Payer: OTHER MISCELLANEOUS

## 2023-08-15 ENCOUNTER — DOCUMENTATION ONLY (OUTPATIENT)
Dept: REHABILITATION | Facility: HOSPITAL | Age: 28
End: 2023-08-15
Payer: OTHER MISCELLANEOUS

## 2023-08-22 ENCOUNTER — OFFICE VISIT (OUTPATIENT)
Dept: ORTHOPEDICS | Facility: CLINIC | Age: 28
End: 2023-08-22
Payer: OTHER MISCELLANEOUS

## 2023-08-22 VITALS — WEIGHT: 150 LBS | RESPIRATION RATE: 18 BRPM | HEIGHT: 73 IN | BODY MASS INDEX: 19.88 KG/M2

## 2023-08-22 DIAGNOSIS — S62.316D CLOSED DISPLACED FRACTURE OF BASE OF FIFTH METACARPAL BONE OF RIGHT HAND WITH ROUTINE HEALING, SUBSEQUENT ENCOUNTER: Primary | ICD-10-CM

## 2023-08-22 PROCEDURE — 99999 PR PBB SHADOW E&M-EST. PATIENT-LVL II: CPT | Mod: PBBFAC,,, | Performed by: ORTHOPAEDIC SURGERY

## 2023-08-22 PROCEDURE — 99999 PR PBB SHADOW E&M-EST. PATIENT-LVL II: ICD-10-PCS | Mod: PBBFAC,,, | Performed by: ORTHOPAEDIC SURGERY

## 2023-08-22 PROCEDURE — 99024 POSTOP FOLLOW-UP VISIT: CPT | Mod: S$GLB,,, | Performed by: ORTHOPAEDIC SURGERY

## 2023-08-22 PROCEDURE — 99024 PR POST-OP FOLLOW-UP VISIT: ICD-10-PCS | Mod: S$GLB,,, | Performed by: ORTHOPAEDIC SURGERY

## 2023-08-22 NOTE — LETTER
August 22, 2023    Romulo Felix  [facility]  [address] [place] [address]         [facility]  [address] [place] [address] [address]  [facility] [address]  Phone: [phone] August 22, 2023     Patient: Romulo Felix   YOB: 1995   Date of Visit: 8/22/2023       To Whom It May Concern:    It is my medical opinion that Romulo Felix may return to full duty immediately with no restrictions.    If you have any questions or concerns, please don't hesitate to call.    Sincerely,        Akshat Morales MD

## 2023-08-22 NOTE — PROGRESS NOTES
Post-op Note    HPI    Romulo Felix is here 12 weeks s/p the following procedure:     Closed reduction percutaneous pinning right hand including the 4th and 5th metacarpals    Overall doing well. Pain controlled on current regimen.    Finished with physical therapy.  Denies any pain today.  Has full strength and range of motion.        Physical Exam:     Patient is alert and oriented no acute distress.       Right hand pin sites clean dry intact There is no evidence of infection.  There is no induration erythema or signs of infection.  Appropriate soft tissue swelling.  Compartments are soft and compressible.  Warm well-perfused extremity.  Able to make composite fist.    No pain with ulnar deviation.  no Tenderness over the 5th metacarpal    Imaging:     I have personally reviewed the following imaging and these are an interpretation of my findings:     Healed base of the 5th metacarpal fracture, previous pin sites evident    Assessment    Romulo Felix is 12 weeks Post-op     Plan:    Overall doing  well.  He has no pain and full range of motion.  Able to make composite fist.  Equivocal strength to the contralateral side.  Discussed return to work without restriction on 08/28.

## 2023-08-22 NOTE — LETTER
August 22, 2023    Romulo Felix  [facility]  [address] [place] [address]         [facility]  [address] [place] [address] [address]  [facility] [address]  Phone: [phone] August 22, 2023     Patient: Romulo Felix   YOB: 1995   Date of Visit: 8/22/2023       To Whom It May Concern:    It is my medical opinion that Romulo Felix may return to work on 08/28/2023, full duty, no restrictions .    If you have any questions or concerns, please don't hesitate to call.    Sincerely,        Akshat Morales MD

## 2023-09-13 ENCOUNTER — DOCUMENTATION ONLY (OUTPATIENT)
Dept: REHABILITATION | Facility: HOSPITAL | Age: 28
End: 2023-09-13
Payer: OTHER MISCELLANEOUS

## 2023-09-13 NOTE — PROGRESS NOTES
[facility] Occupational Therapy Discharge Note   Date: 9/13/2023  Name: Romulo Felix  Clinic Number: [id number]  Therapy Diagnosis: right hand pain, stiffness, and weakness  Physician: Akshat Morales MD  Medical Diagnosis: S62.316A (ICD-10-CM) - Closed displaced fracture of base of fifth metacarpal bone of right hand,  Surgical Procedure and Date: 5/25/2023  , ORIF, HAND Right   Evaluation Date: 7/6/2023  Visit # / Visits authorized: 5 visits    FOTO: 8/7/23  67%; eval 57%  goal 72%  Subjective as of 8/07/23   Pt reports: Doing much better. Able to  screw gun with the battery in it just still bothers me with turning it some.  Can put a little more pressure into my hand. Carrying grocery bags more. During session noted overall arm feeling weak.  Objective   Wrist -  Range Of Motion      7/6/2023 7/6/2023  Motion AROM Left A/PROM Right   Flexion 60 40/45   Extension 65 45/45   Ulnar Deviation NT 20   Radial Deviation NT 20   Wrist Extension and UD painful at little metacarpal.  Able to make a tight fist except Ring MP flexion 75,  left  95.  Full finger extension except little lacking 5 degrees active Proximal interphalangeal joint extension. Unable to hyperextend at ring (unable to lift off table) and achieving ~ 5 degrees at little finger.     Hand -  Strength  Martín dynamometer 2nd Rung in lbs;  Pinches: pinch gauge average of 2 trials in psi    7/06/23 7/06/23   Position Right Left   Hand : Trial 1 20 110   Hand : Trial 2 17 93   Hand : Trial 3 17 98    Average 18 100   Pinch Lateral 6.5 19   3 jaw bill 8 18.5   Tip 5 10       8/07/23 Active range of motion:  Wrist extension Right 70 degrees   Left 90 degrees        Assessment   Patient noting pain decreasing. Pain primarily at injury site with pressure on it.  He is exhibiting an increase in wrist extension with minimal limitation in wrist extension.  Able to weightbear into hand a little  "better and use keyboard much better. Noting increase in functional use of hand.  Overall tolerated therapy better today. FOTO score increased by 10% as compared to eval.   Goals:  Short Term Goals  Independent in home program of ROM and strengthening in 2 visits-met  Patient with increase in range of motion and decrease in pain allowing increase use of right for self care in 2 weeks-met  Patient with increase in range of motion and strength and decrease in pain allowing him to be able to hold screw gun without pain in 3 weeks-met  Long Term   Patient with increase in range of motion and strength and decrease in pain allowing for greater ease with all household tasks in 5 weeks-met  Patient with increase in range of motion and strength and decrease in pain allowing for return to work in 6 weeks-met  Improve FOTO score by 10 points indicating improved function of right hand in 6 weeks-met  8/08 further improve score by 5%.-not met due to discharge  Plan   Discharge from skilled OT. On 8/15 patient canceled further sessions due to "condition improved".   PAVEL Mcnair     "

## (undated) DEVICE — SUT 2/0 18IN COATED VICRYL

## (undated) DEVICE — SYR LUER LOCK STERILE 10ML

## (undated) DEVICE — GLOVE SURG ULTRA TOUCH 8

## (undated) DEVICE — ALCOHOL 70% ISOP RUBBING 4OZ

## (undated) DEVICE — TOWEL OR DISP STRL BLUE 4/PK

## (undated) DEVICE — PACK CUSTOM UNIV BASIN SLI

## (undated) DEVICE — SPONGE BULKEE II ABSRB 6X6.75

## (undated) DEVICE — BANDAGE ESMARK ELASTIC ST 4X9

## (undated) DEVICE — DRAPE HAND STERILE

## (undated) DEVICE — SUT ETHILON 3-0 PS2 18 BLK

## (undated) DEVICE — SEE MEDLINE ITEM 157216

## (undated) DEVICE — DRAPE U SPLIT SHEET 54X76IN

## (undated) DEVICE — DRAPE STERI-DRAPE 1000 17X11IN

## (undated) DEVICE — GLOVE SURG ULTRA TOUCH 8.5

## (undated) DEVICE — ELECTRODE ELECSURG NDL

## (undated) DEVICE — NDL SAFETY 25G X 1.5 ECLIPSE

## (undated) DEVICE — ELECTRODE REM PLYHSV RETURN 9

## (undated) DEVICE — LINER SUCTION 3000CC

## (undated) DEVICE — PAD CAST SPECIALIST STRL 4

## (undated) DEVICE — GOWN POLY REINF BRTH SLV 2XL

## (undated) DEVICE — APPLICATOR CHLORAPREP ORN 26ML

## (undated) DEVICE — PACK SIRUS BASIC V SURG STRL

## (undated) DEVICE — SLEEVE SCD EXPRESS KNEE MEDIUM

## (undated) DEVICE — DRESSING XEROFORM NONADH 1X8IN

## (undated) DEVICE — GOWN POLY REINF BRTH SLV LG

## (undated) DEVICE — DRAPE C ARM 42 X 120 10/BX

## (undated) DEVICE — DRAPE THREE-QTR REINF 53X77IN

## (undated) DEVICE — UNDERGLOVES BIOGEL PI SIZE 8.5

## (undated) DEVICE — SOL 9P NACL IRR PIC IL

## (undated) DEVICE — TOURNIQUET SB QC DP 18X4IN

## (undated) DEVICE — GOWN POLY REINF BRTH SLV XL

## (undated) DEVICE — STRAP OR TABLE 5IN X 72IN

## (undated) DEVICE — BANDAGE MATRIX HK LOOP 4IN 5YD